# Patient Record
Sex: MALE | Race: WHITE | Employment: OTHER | URBAN - METROPOLITAN AREA
[De-identification: names, ages, dates, MRNs, and addresses within clinical notes are randomized per-mention and may not be internally consistent; named-entity substitution may affect disease eponyms.]

---

## 2017-01-02 PROBLEM — R53.82 CHRONIC FATIGUE: Status: ACTIVE | Noted: 2017-01-02

## 2017-04-03 ENCOUNTER — ANESTHESIA EVENT (OUTPATIENT)
Dept: SURGERY | Age: 72
End: 2017-04-03
Payer: MEDICARE

## 2017-04-03 ENCOUNTER — HOSPITAL ENCOUNTER (OUTPATIENT)
Age: 72
Setting detail: OUTPATIENT SURGERY
Discharge: HOME | End: 2017-04-03
Attending: UROLOGY | Admitting: UROLOGY
Payer: MEDICARE

## 2017-04-03 ENCOUNTER — ANESTHESIA (OUTPATIENT)
Dept: SURGERY | Age: 72
End: 2017-04-03
Payer: MEDICARE

## 2017-04-03 ENCOUNTER — APPOINTMENT (OUTPATIENT)
Dept: GENERAL RADIOLOGY | Age: 72
End: 2017-04-03
Attending: UROLOGY
Payer: MEDICARE

## 2017-04-03 VITALS
RESPIRATION RATE: 14 BRPM | TEMPERATURE: 97.5 F | HEIGHT: 69 IN | DIASTOLIC BLOOD PRESSURE: 71 MMHG | OXYGEN SATURATION: 97 % | BODY MASS INDEX: 27.11 KG/M2 | WEIGHT: 183 LBS | SYSTOLIC BLOOD PRESSURE: 122 MMHG | HEART RATE: 59 BPM

## 2017-04-03 PROCEDURE — A4648 IMPLANTABLE TISSUE MARKER: HCPCS | Performed by: UROLOGY

## 2017-04-03 PROCEDURE — 74430 CONTRAST X-RAY BLADDER: CPT

## 2017-04-03 PROCEDURE — 76060000032 HC ANESTHESIA 0.5 TO 1 HR: Performed by: UROLOGY

## 2017-04-03 PROCEDURE — 77030005518 HC CATH URETH FOL 2W BARD -B: Performed by: UROLOGY

## 2017-04-03 PROCEDURE — 74011636320 HC RX REV CODE- 636/320: Performed by: UROLOGY

## 2017-04-03 PROCEDURE — 77030020269 HC MISC IMPL: Performed by: UROLOGY

## 2017-04-03 PROCEDURE — 77030032490 HC SLV COMPR SCD KNE COVD -B: Performed by: UROLOGY

## 2017-04-03 PROCEDURE — 74011000250 HC RX REV CODE- 250: Performed by: UROLOGY

## 2017-04-03 PROCEDURE — 74011000250 HC RX REV CODE- 250

## 2017-04-03 PROCEDURE — 76210000006 HC OR PH I REC 0.5 TO 1 HR: Performed by: UROLOGY

## 2017-04-03 PROCEDURE — 77030012510 HC MSK AIRWY LMA TELE -B: Performed by: NURSE ANESTHETIST, CERTIFIED REGISTERED

## 2017-04-03 PROCEDURE — 74011250636 HC RX REV CODE- 250/636

## 2017-04-03 PROCEDURE — 76010000138 HC OR TIME 0.5 TO 1 HR: Performed by: UROLOGY

## 2017-04-03 PROCEDURE — 77030018846 HC SOL IRR STRL H20 ICUM -A: Performed by: UROLOGY

## 2017-04-03 PROCEDURE — 76210000021 HC REC RM PH II 0.5 TO 1 HR: Performed by: UROLOGY

## 2017-04-03 RX ORDER — FUROSEMIDE 20 MG/1
TABLET ORAL DAILY
COMMUNITY
End: 2017-05-31 | Stop reason: ALTCHOICE

## 2017-04-03 RX ORDER — LIDOCAINE HYDROCHLORIDE 10 MG/ML
INJECTION INFILTRATION; PERINEURAL AS NEEDED
Status: DISCONTINUED | OUTPATIENT
Start: 2017-04-03 | End: 2017-04-03 | Stop reason: HOSPADM

## 2017-04-03 RX ORDER — CEFAZOLIN SODIUM IN 0.9 % NACL 2 G/100 ML
PLASTIC BAG, INJECTION (ML) INTRAVENOUS AS NEEDED
Status: DISCONTINUED | OUTPATIENT
Start: 2017-04-03 | End: 2017-04-03 | Stop reason: HOSPADM

## 2017-04-03 RX ORDER — OXYCODONE AND ACETAMINOPHEN 7.5; 325 MG/1; MG/1
1 TABLET ORAL
Status: DISCONTINUED | OUTPATIENT
Start: 2017-04-03 | End: 2017-04-03 | Stop reason: HOSPADM

## 2017-04-03 RX ORDER — OXYCODONE AND ACETAMINOPHEN 5; 325 MG/1; MG/1
1-2 TABLET ORAL
Qty: 6 TAB | Refills: 0 | Status: SHIPPED | OUTPATIENT
Start: 2017-04-03 | End: 2017-08-09 | Stop reason: ALTCHOICE

## 2017-04-03 RX ORDER — SODIUM CHLORIDE, SODIUM LACTATE, POTASSIUM CHLORIDE, CALCIUM CHLORIDE 600; 310; 30; 20 MG/100ML; MG/100ML; MG/100ML; MG/100ML
INJECTION, SOLUTION INTRAVENOUS
Status: DISCONTINUED | OUTPATIENT
Start: 2017-04-03 | End: 2017-04-03 | Stop reason: HOSPADM

## 2017-04-03 RX ORDER — CEPHALEXIN 500 MG/1
500 CAPSULE ORAL 2 TIMES DAILY
Qty: 6 CAP | Refills: 0 | Status: SHIPPED | OUTPATIENT
Start: 2017-04-03 | End: 2017-04-06

## 2017-04-03 RX ORDER — DOCUSATE SODIUM 100 MG/1
100 CAPSULE, LIQUID FILLED ORAL 2 TIMES DAILY
Qty: 60 CAP | Refills: 0 | Status: SHIPPED | OUTPATIENT
Start: 2017-04-03 | End: 2017-05-03

## 2017-04-03 RX ORDER — FENTANYL CITRATE 50 UG/ML
INJECTION, SOLUTION INTRAMUSCULAR; INTRAVENOUS AS NEEDED
Status: DISCONTINUED | OUTPATIENT
Start: 2017-04-03 | End: 2017-04-03 | Stop reason: HOSPADM

## 2017-04-03 RX ORDER — ONDANSETRON 2 MG/ML
INJECTION INTRAMUSCULAR; INTRAVENOUS AS NEEDED
Status: DISCONTINUED | OUTPATIENT
Start: 2017-04-03 | End: 2017-04-03 | Stop reason: HOSPADM

## 2017-04-03 RX ORDER — SODIUM CHLORIDE, SODIUM LACTATE, POTASSIUM CHLORIDE, CALCIUM CHLORIDE 600; 310; 30; 20 MG/100ML; MG/100ML; MG/100ML; MG/100ML
50 INJECTION, SOLUTION INTRAVENOUS CONTINUOUS
Status: DISCONTINUED | OUTPATIENT
Start: 2017-04-03 | End: 2017-04-03 | Stop reason: HOSPADM

## 2017-04-03 RX ORDER — PROPOFOL 10 MG/ML
INJECTION, EMULSION INTRAVENOUS AS NEEDED
Status: DISCONTINUED | OUTPATIENT
Start: 2017-04-03 | End: 2017-04-03 | Stop reason: HOSPADM

## 2017-04-03 RX ORDER — LIDOCAINE HYDROCHLORIDE 20 MG/ML
INJECTION, SOLUTION EPIDURAL; INFILTRATION; INTRACAUDAL; PERINEURAL AS NEEDED
Status: DISCONTINUED | OUTPATIENT
Start: 2017-04-03 | End: 2017-04-03 | Stop reason: HOSPADM

## 2017-04-03 RX ORDER — FENTANYL CITRATE 50 UG/ML
50 INJECTION, SOLUTION INTRAMUSCULAR; INTRAVENOUS
Status: DISCONTINUED | OUTPATIENT
Start: 2017-04-03 | End: 2017-04-03 | Stop reason: HOSPADM

## 2017-04-03 RX ORDER — MIDAZOLAM HYDROCHLORIDE 1 MG/ML
INJECTION, SOLUTION INTRAMUSCULAR; INTRAVENOUS AS NEEDED
Status: DISCONTINUED | OUTPATIENT
Start: 2017-04-03 | End: 2017-04-03 | Stop reason: HOSPADM

## 2017-04-03 RX ADMIN — PROPOFOL 100 MG: 10 INJECTION, EMULSION INTRAVENOUS at 12:05

## 2017-04-03 RX ADMIN — ONDANSETRON 4 MG: 2 INJECTION INTRAMUSCULAR; INTRAVENOUS at 12:23

## 2017-04-03 RX ADMIN — FENTANYL CITRATE 25 MCG: 50 INJECTION, SOLUTION INTRAMUSCULAR; INTRAVENOUS at 12:29

## 2017-04-03 RX ADMIN — SODIUM CHLORIDE, SODIUM LACTATE, POTASSIUM CHLORIDE, CALCIUM CHLORIDE: 600; 310; 30; 20 INJECTION, SOLUTION INTRAVENOUS at 12:02

## 2017-04-03 RX ADMIN — LIDOCAINE HYDROCHLORIDE 40 MG: 20 INJECTION, SOLUTION EPIDURAL; INFILTRATION; INTRACAUDAL; PERINEURAL at 12:01

## 2017-04-03 RX ADMIN — FENTANYL CITRATE 25 MCG: 50 INJECTION, SOLUTION INTRAMUSCULAR; INTRAVENOUS at 12:17

## 2017-04-03 RX ADMIN — Medication 2 G: at 12:06

## 2017-04-03 RX ADMIN — FENTANYL CITRATE 50 MCG: 50 INJECTION, SOLUTION INTRAMUSCULAR; INTRAVENOUS at 12:00

## 2017-04-03 RX ADMIN — MIDAZOLAM HYDROCHLORIDE 2 MG: 1 INJECTION, SOLUTION INTRAMUSCULAR; INTRAVENOUS at 11:57

## 2017-04-03 NOTE — BRIEF OP NOTE
BRIEF OPERATIVE NOTE    Date of Procedure: 4/3/2017   Preoperative Diagnosis: prostate cancer c61  Postoperative Diagnosis: prostate cancer c61    Procedure(s): FIDUCIAL SEED MARKERS,cystogram  Surgeon(s) and Role:     * Greg Marin MD - Primary            Surgical Staff:  Circ-1: Wendie Almonte RN  Radiology Technician: ALONSO Forbes, RT, NM, ARRT  Scrub Tech-1: Shonda Burgos  Event Time In   Incision Start 12:13 PM   Incision Close      Anesthesia: General   Estimated Blood Loss: minimal  Specimens: none  Findings: Surgically absent prostate, catheter placed to identified bladder neck. Three seeds placed at bladder neck. Complications: none    Implants:   Implant Name Type Inv.  Item Serial No.  Lot No. LRB No. Used Action   Swapsee polymer markers         [id number] N/A 1 Implanted     Greg Marin MD

## 2017-04-03 NOTE — DISCHARGE INSTRUCTIONS
Discharge Instructions      Patient: Zena Vences               Sex: male          DOA: 4/3/2017         YOB: 1945      Age:  70 y.o.        LOS:  LOS: 0 days     ACUTE DIAGNOSES:  prostate cancer c61    CHRONIC MEDICAL DIAGNOSES:  Problem List as of 4/3/2017  Date Reviewed: 4/3/2017          Codes Class Noted - Resolved    Major depressive disorder ICD-10-CM: F32.9  ICD-9-CM: 296.20  Unknown - Present        HTN (hypertension) ICD-10-CM: I10  ICD-9-CM: 401.9  Unknown - Present        HLD (hyperlipidemia) ICD-10-CM: E78.5  ICD-9-CM: 272.4  Unknown - Present        Mason's esophagus with esophagitis ICD-10-CM: K22.70, K20.9  ICD-9-CM: 530.85, 530.10  Unknown - Present        Wears dentures ICD-10-CM: Z97.2, A65.196  ICD-9-CM: V45.84  Unknown - Present        Urinary tract infection, site not specified ICD-10-CM: N39.0  ICD-9-CM: 599.0  Unknown - Present        Urinary incontinence ICD-10-CM: R32  ICD-9-CM: 788.30  Unknown - Present        Renal failure ICD-10-CM: N19  ICD-9-CM: 484  Unknown - Present        Renal disease ICD-10-CM: N28.9  ICD-9-CM: 593.9  Unknown - Present        Personal history of prostate cancer ICD-10-CM: Z85.46  ICD-9-CM: V10.46  Unknown - Present        History of blood transfusion ICD-10-CM: Z92.89  ICD-9-CM: V15.89  Unknown - Present        Hiatus hernia syndrome ICD-10-CM: K44.9  ICD-9-CM: 553.3  Unknown - Present        Hiatal hernia ICD-10-CM: K44.9  ICD-9-CM: 553.3  Unknown - Present        Esophageal reflux ICD-10-CM: K21.9  ICD-9-CM: 530.81  Unknown - Present        DJD (degenerative joint disease) ICD-10-CM: M19.90  ICD-9-CM: 715.90  Unknown - Present        Depression ICD-10-CM: F32.9  ICD-9-CM: 311  Unknown - Present        Deficiency anemia ICD-10-CM: D53.9  ICD-9-CM: 281.9  Unknown - Present        Decreased hearing ICD-10-CM: H91.90  ICD-9-CM: 389. 9  Unknown - Present        Coronary artery disease ICD-10-CM: I25.10  ICD-9-CM: 414.00  Unknown - Present Asthma ICD-10-CM: J45.909  ICD-9-CM: 493.90  Unknown - Present        Anxiety ICD-10-CM: F41.9  ICD-9-CM: 300.00  Unknown - Present        Abdominal wall hernia ICD-10-CM: K43.9  ICD-9-CM: 553.20  Unknown - Present        Chronic fatigue ICD-10-CM: R53.82  ICD-9-CM: 780.79  1/2/2017 - Present        Pneumonia due to infectious organism ICD-10-CM: J18.9  ICD-9-CM: 136.9, 484.8  10/14/2016 - Present        Vitamin D deficiency ICD-10-CM: E55.9  ICD-9-CM: 268.9  9/15/2016 - Present        Mixed incontinence ICD-10-CM: N39.46  ICD-9-CM: 788.33  9/8/2016 - Present        Urinary tract infection ICD-10-CM: N39.0  ICD-9-CM: 599.0  9/8/2016 - Present        Incisional hernia ICD-10-CM: K43.2  ICD-9-CM: 553.21  9/6/2016 - Present        History of surgical procedure ICD-10-CM: Z98.890  ICD-9-CM: V45.89  8/17/2016 - Present        Bone Metastases from Prostate Cancer ICD-10-CM: C61, C79.51  ICD-9-CM: 185, 198.5  7/23/2016 - Present        Chronic low back pain ICD-10-CM: M54.5, G89.29  ICD-9-CM: 724.2, 338.29  6/13/2016 - Present        Atelectasis ICD-10-CM: J98.11  ICD-9-CM: 518.0  6/13/2016 - Present        Bronchitis, chronic, simple ([facility] [address] [facility] ICD-10-CM: J41.0  ICD-9-CM: 491.0  6/13/2016 - Present        Cardiomyopathy ([facility] ICD-10-CM: I42.9  ICD-9-CM: 425.4  Unknown - Present        Prostate cancer, [id number]  dvp Vani [id number] gl4+3(tertiary 5) psa gray 2.2 with bone mets ICD-10-CM: C61  ICD-9-CM: 185  Unknown - Present        Major depressive disorder, recurrent severe without psychotic features ([facility] ICD-10-CM: F33.2  ICD-9-CM: 296.33  8/2/2014 - Present        Essential hypertension ICD-10-CM: I10  ICD-9-CM: 401.9  Unknown - Present        COPD (chronic obstructive pulmonary disease) ([facility] ICD-10-CM: J44.9  ICD-9-CM: 80  Unknown - Present        Mason's esophagus ICD-10-CM: K22.70  ICD-9-CM: 530.85  Unknown - Present        Multiple-type hyperlipidemia ICD-10-CM: E78.4  ICD-9-CM: 272.4  Unknown - Present RESOLVED: Cardiomyopathy ICD-10-CM: 425  ICD-9-CM: 425  Unknown - 7/23/2016        RESOLVED: Renal failure ICD-10-CM: N19  ICD-9-CM: 440  Unknown - 7/23/2016              DISCHARGE MEDICATIONS:       · It is important that you take the medication exactly as they are prescribed. · Keep your medication in the bottles provided by the pharmacist and keep a list of the medication names, dosages, and times to be taken in your wallet. · Do not take other medications without consulting your doctor. DIET:  Resume previous diet    ACTIVITY: No lifting, Driving, or Strenuous exercise for 48 hours. ADDITIONAL INFORMATION: If you experience any of the following symptoms then please call your primary care physician or return to the emergency room if you cannot get hold of your doctor: Fever, chills, nausea, vomiting, diarrhea, change in mentation, falling, bleeding, shortness of breath. FOLLOW UP CARE:  Dr. Gordon Lora, NP  you are to call and set up an appointment to see them in 2 weeks. Follow-up with Dr. Pieter Thomas after Proton therapy completed. Information obtained by :  I understand that if any problems occur once I am at home I am to contact my physician. I understand and acknowledge receipt of the instructions indicated above.                                                                                                                                            Physician's or R.N.'s Signature                                                                  Date/Time                                                                                                                                              Patient or Representative Signature                                                          Date/Time2      DISCHARGE SUMMARY from Nurse    The following personal items are in your possession at time of discharge:    Dental Appliances: Uppers, Lowers, At home  Visual Aid: None  Hearing Aids/Status: At home                         PATIENT INSTRUCTIONS:    After general anesthesia or intravenous sedation, for 24 hours or while taking prescription Narcotics:  · Limit your activities  · Do not drive and operate hazardous machinery  · Do not make important personal or business decisions  · Do  not drink alcoholic beverages  · If you have not urinated within 8 hours after discharge, please contact your surgeon on call. Report the following to your surgeon:  · Excessive pain, swelling, redness or odor of or around the surgical area  · Temperature over 100.5  · Nausea and vomiting lasting longer than 4 hours or if unable to take medications  · Any signs of decreased circulation or nerve impairment to extremity: change in color, persistent  numbness, tingling, coldness or increase pain  · Any questions        What to do at Home:  These are general instructions for a healthy lifestyle:    No smoking/ No tobacco products/ Avoid exposure to second hand smoke    Surgeon General's Warning:  Quitting smoking now greatly reduces serious risk to your health. Obesity, smoking, and sedentary lifestyle greatly increases your risk for illness    A healthy diet, regular physical exercise & weight monitoring are important for maintaining a healthy lifestyle    You may be retaining fluid if you have a history of heart failure or if you experience any of the following symptoms:  Weight gain of 3 pounds or more overnight or 5 pounds in a week, increased swelling in our hands or feet or shortness of breath while lying flat in bed. Please call your doctor as soon as you notice any of these symptoms; do not wait until your next office visit.     Recognize signs and symptoms of STROKE:    F-face looks uneven    A-arms unable to move or move unevenly    S-speech slurred or non-existent    T-time-call 911 as soon as signs and symptoms begin-DO NOT go       Back to bed or wait to see if you get better-TIME IS BRAIN. Warning Signs of HEART ATTACK     Call 911 if you have these symptoms:   Chest discomfort. Most heart attacks involve discomfort in the center of the chest that lasts more than a few minutes, or that goes away and comes back. It can feel like uncomfortable pressure, squeezing, fullness, or pain.  Discomfort in other areas of the upper body. Symptoms can include pain or discomfort in one or both arms, the back, neck, jaw, or stomach.  Shortness of breath with or without chest discomfort.  Other signs may include breaking out in a cold sweat, nausea, or lightheadedness. Don't wait more than five minutes to call 911 - MINUTES MATTER! Fast action can save your life. Calling 911 is almost always the fastest way to get lifesaving treatment. Emergency Medical Services staff can begin treatment when they arrive -- up to an hour sooner than if someone gets to the hospital by car. The discharge information has been reviewed with the patient. The patient verbalized understanding. Discharge medications reviewed with the patient and appropriate educational materials and side effects teaching were provided. Patient armband removed and given to patient to take home.   Patient was informed of the privacy risks if armband lost or stolen

## 2017-04-03 NOTE — ANESTHESIA POSTPROCEDURE EVALUATION
Post-Anesthesia Evaluation & Assessment    Visit Vitals    /70    Pulse 60    Temp 36.4 °C (97.5 °F)    Resp 20    Ht 5' 9\" (1.753 m)    Wt 83 kg (183 lb)    SpO2 100%    BMI 27.02 kg/m2       Nausea/Vomiting controlled    Post-operative hydration adequate. Mental status & Level of consciousness: alert and oriented x 3    Neurological status: moves all extremities, sensation grossly intact    Pulmonary status: airway patent, no supplemental oxygen required    Complications related to anesthesia: none    Patient has met all discharge requirements.     Additional comments:        Cresencio Reyes, CRNA

## 2017-04-03 NOTE — H&P
Stephanie Vargas  1945     ASSESSMENT:       ICD-10-CM ICD-9-CM     1. Prostate cancer, [id number] dvp [place] [id number] gl4+3(tertiary 5) psa gray 2.2 with bone mets C61 185 AMB POC URINALYSIS DIP STICK AUTO W/O MICRO   2. Urinary frequency R35.0 788.41 AMB POC URINALYSIS DIP STICK AUTO W/O MICRO      1. [id number] gl4+3, s/p DVP in 12/9/15 with presenting PSA of 9.7ng/ml. Bone scan 6/2016 revealed findings worrisome for metastatic osseous disease in the left ischium and possible metastatic osseous disease in both iliac bones anteriorly. Dr. Dario Veras did not believe patient had metastatic disease. Discussed indications, r/b of proton therapy. Patient inquiring if estrogen levels will be affected and how that will impact his hypogonadism. All questions answered  2. S/p orchiectomy in April 2016.   3. Osteopenia      PLAN:    1. Follow up as scheduled for fiducial markers only as we feel SpaceOAR is not indicated in this instance. 2. Will send chart to Dr. Judit Ferrara to review need for possible estrogen for cardio protection as he has had recent bout of CHF and history of viral cardiomyopathy. Will also have him discuss Prolia vs Xgeva for osteopenia.          Chief Complaint   Patient presents with    Prostate Cancer         HISTORY OF PRESENT ILLNESS: Stephanie Vargas is a 70 y.o. male who presents today for fiducial marker and spaceOAR gel placement consult. He is referred by Dr. Mateo Plummer. The patient is s/p DVP in [facility] on 12/9/2015 for [id number] Christopher 4+3 (tertiary 5) adenocarcinoma of the prostate. PSA at diagnosis of 9.7ng/ml. Had postop PSA at 4 months which was reportedly 2.2ng/ml. States preop CT and bone scan were negative for metastatic disease.  His bone scan 6/2016 revealed findings worrisome for metastatic osseous disease in the left ischium and possible metastatic osseous disease in both iliac bones anteriorly.     He is s/p orchiectomy in April 2016 and his PSA has gone down some but still detectable. Dr. Gabino Jones wants to ensure prostate bed is treated fully and he is asking for Proton therapy.          PSA /TESTOSTERONE - BSI PSA   Latest Ref Rng & Units 0.00 - 4.00 ng/mL   6/3/2016 3.27   4/20/2015 9.3 (A)   2/19/2015 6.8 (A)   12/12/2014 4.24 (A)           Past Medical History:   Diagnosis Date    Abdominal wall hernia      Anxiety      Asthma      Mason's esophagus with esophagitis      Cardiomyopathy      Chronic fatigue      CKD (chronic kidney disease), stage III      COPD (chronic obstructive pulmonary disease) (HCC)      Coronary artery disease      Decreased hearing      Deficiency anemia      Depression      DJD (degenerative joint disease)      Esophageal reflux      Hiatal hernia      Hiatus hernia syndrome      History of blood transfusion      HLD (hyperlipidemia)      HTN (hypertension)      Major depressive disorder      Mixed incontinence      Personal history of prostate cancer      Prostate cancer ([facility]      Prostate cancer metastatic to bone ([facility]      Renal disease      Renal failure      Urinary incontinence      Urinary tract infection, site not specified      UTI (urinary tract infection)      Vitamin D deficiency      Wears dentures              Past Surgical History:   Procedure Laterality Date    BIOPSY PROSTATE         [id number] gl4+3.     HX HERNIA REPAIR        HX KNEE REPLACEMENT        HX ORCHIECTOMY   08/11/2016    HX OTHER SURGICAL         IMPLANTS    HX OTHER SURGICAL         TOTAL KNEE/ TOTAL HIP REPLACEMENT PRE OP ORDERSET    HX OTHER SURGICAL   08/11/2016     HERNIA REPAIR LAPAROSCOPIC    HX OTHER SURGICAL   08/11/2016     TESTICULAR PROSTHESIS INSERTION    HX RADICAL PROSTATECTOMY   12.2015           Social History   Substance Use Topics    Smoking status: Never Smoker    Smokeless tobacco: Never Used    Alcohol use No            Allergies   Allergen Reactions    Egg Unable to Obtain    Ketorolac Tromethamine Other (comments)       Kidney insufficient    Nsaids (Non-Steroidal Anti-Inflammatory Drug) Other (comments)    Prilosec [Omeprazole] Other (comments)       Flank and abd pain    Proton Pump Inhibitors Other (comments)             Family History   Problem Relation Age of Onset    Other Sister         cerebral palsy    Cancer Other         colon             Current Outpatient Prescriptions   Medication Sig Dispense Refill    ondansetron (ZOFRAN ODT) 4 mg disintegrating tablet 8 mg.        traZODone (DESYREL) 150 mg tablet TAKE 2 TABLETS EVERYDAY AT BEDTIME   0    peg-electrolyte soln (NULYTELY) 420 gram solution USE AS DIRECTED   0    ADVAIR DISKUS 500-50 mcg/dose diskus inhaler TAKE 1 PUFF INHALED BY MOUTH TWICE DAILY. FOR COPD   12    amitriptyline (ELAVIL) 50 mg tablet TAKE 2 TABLETS BY MOUTH EVERY NIGHT AT BEDTIME   1    QUEtiapine (SEROQUEL) 100 mg tablet 100 mg.        hydrALAZINE (APRESOLINE) 25 mg tablet ONE TABLET THREE TIMES A DAY BY MOUTH   6    raNITIdine (ZANTAC) 150 mg tablet TAKE 1 TAB BY MOUTH TWICE DAILY.   3    pravastatin (PRAVACHOL) 20 mg tablet          ipratropium (ATROVENT) 0.02 % nebulizer solution 2.5 mL by Nebulization route as needed for Wheezing. 1 Each 0    aspirin delayed-release 325 mg tablet Take by mouth every six (6) hours as needed.        amLODIPine (NORVASC) 5 mg tablet Take 15 mg by mouth daily.        carvedilol (COREG) 25 mg tablet Take 25 mg by mouth two (2) times daily (with meals).           Review of Systems  Constitutional: Fever: No  Skin: Rash: No  HEENT: Hearing difficulty: No  Eyes: Blurred vision: No  Cardiovascular: Chest pain: No  Respiratory: Shortness of breath: No  Gastrointestinal: Nausea/vomiting: No  Musculoskeletal: Back pain: No  Neurological: Weakness: No  Psychological: Memory loss: No  Comments/additional findings:      PHYSICAL EXAMINATION:        Visit Vitals    /70    Ht 5' 9\" (1.753 m)    Wt 187 lb (84.8 kg)    BMI 27.62 kg/m2      Constitutional: WDWN, Pleasant and appropriate affect, No acute distress. CV: No peripheral swelling noted, RRR  Respiratory: No respiratory distress or difficulties, CTAB  Abdomen: No abdominal masses or tenderness. No CVA tenderness. No inguinal hernias noted.  Male (8/11/16):   PRAKASH:Perineum normal to visual inspection, no erythema or irritation, Sphincter with good tone, Rectum with no hemorrhoids, fissures or masses, Prostate absent; rectum  negative without mass, lesions or tenderness  SCROTUM: No scrotal rash or lesions noticed. Normal bilateral testes and epididymis. PENIS: Urethral meatus normal in location and size. No urethral discharge. Skin: No jaundice. Neuro/Psych: Alert and oriented x 3, affect appropriate. Lymphatic: No enlarged inguinal lymph nodes.      REVIEW OF LABS AND IMAGING:         Results for orders placed or performed in visit on 02/22/17   PROSTATE SPECIFIC AG (PSA)   Result Value Ref Range     Prostate Specific Ag 1.49 0.00 - 4.00 ng/mL   PROSTATE SPECIFIC AG (PSA)   Result Value Ref Range     Prostate Specific Ag 4.24 (A) 0.00 - 4.00 ng/mL   PROSTATE SPECIFIC AG (PSA)   Result Value Ref Range     Prostate Specific Ag 6.8 (A) 0.00 - 4.00 ng/mL   PROSTATE SPECIFIC AG (PSA)   Result Value Ref Range     Prostate Specific Ag 9.3 (A) 0.00 - 4.00 ng/mL      Bone Scan 6/18/16:  IMPRESSION:  Findings worrisome for metastatic osseous disease in the left  ischium. Possible metastatic osseous disease in both iliac bones  anteriorly. Multifocal degenerative and arthropathic uptake. Findings of remote granulomatous infection. There is mild dilatation of the main pancreatic duct which is  nonspecific.  There is no pancreatic atrophy.  Clinical  correlation recommended.  Evaluation of the pancreas for neoplasm  is limited in the absence of IV contrast.  A contrast-enhanced  abdominal MRI could be considered for further characterization. Cholelithiasis.   Right pelvic sidewall lymphocele.   Multifocal subcutaneous calcifications likely represent injection  sites or fat necrosis.  A few small low density collections are  associated with calcifications in the right buttock.  Correlate  with surgical history and history of cosmetic injections.        Courtney Carranza MD on 4/3/2017

## 2017-04-03 NOTE — IP AVS SNAPSHOT
[address] 
[phone] Patient: Fany Medina MRN: [id number] UWF:[id number] You are allergic to the following Allergen Reactions Egg Unable to Consolidated Abdiel Ketorolac Tromethamine Other (comments) Kidney insufficient Nsaids (Non-Steroidal Anti-Inflammatory Drug) Other (comments) Prilosec (Omeprazole) Other (comments) Flank and abd pain Proton Pump Inhibitors Other (comments) Recent Documentation Height Weight BMI Smoking Status 1.753 m 83 kg 27.02 kg/m2 Never Smoker Emergency Contacts Name Discharge Info Relation Home Work Mobile Aj Baeza  Other Relative [6] [phone] Izzy Job  Child [2] [phone] Sami De Guzman DISCHARGE CAREGIVER [3] Friend [5]   [phone] About your hospitalization You were admitted on:  April 3, 2017 You last received care in the:  [facility] PHASE 2 RECOVERY You were discharged on:  April 3, 2017 Unit phone number:  [phone] Why you were hospitalized Your primary diagnosis was:  Not on File Your diagnoses also included:  Prostate Cancer (Hcc) Providers Seen During Your Hospitalizations Provider Role Specialty Primary office phone Inez Motta MD Attending Provider Urology [phone] Your Primary Care Physician (PCP) Primary Care Physician Office Phone Office Fax Olga Vega [phone] Follow-up Information Follow up With Details Comments Contact Info Manju Guzmán NP   [address] 
[phone] Current Discharge Medication List  
  
START taking these medications Dose & Instructions Dispensing Information Comments Morning Noon Evening Bedtime  
 cephALEXin 500 mg capsule Commonly known as:  Erling Mittal Your last dose was: Your next dose is:    
   
   
 Dose:  500 mg Take 1 Cap by mouth two (2) times a day for 3 days. Quantity:  6 Cap Refills:  0  
     
   
   
   
  
 docusate sodium 100 mg capsule Commonly known as:  Philip [place] Your last dose was: Your next dose is:    
   
   
 Dose:  100 mg Take 1 Cap by mouth two (2) times a day for 30 days. Quantity:  60 Cap Refills:  0  
     
   
   
   
  
 oxyCODONE-acetaminophen 5-325 mg per tablet Commonly known as:  PERCOCET Your last dose was: Your next dose is:    
   
   
 Dose:  1-2 Tab Take 1-2 Tabs by mouth every four (4) hours as needed for Pain. Max Daily Amount: 12 Tabs. Quantity:  6 Tab Refills:  0 CONTINUE these medications which have NOT CHANGED Dose & Instructions Dispensing Information Comments Morning Noon Evening Bedtime ADVAIR DISKUS 500-50 mcg/dose diskus inhaler Generic drug:  fluticasone-salmeterol Your last dose was: Your next dose is: TAKE 1 PUFF INHALED BY MOUTH TWICE DAILY. FOR COPD Refills:  12  
     
   
   
   
  
 amitriptyline 50 mg tablet Commonly known as:  ELAVIL Your last dose was: Your next dose is: TAKE 2 TABLETS BY MOUTH EVERY NIGHT AT BEDTIME Refills:  1  
     
   
   
   
  
 amLODIPine 5 mg tablet Commonly known as:  Amelia Pearson Your last dose was: Your next dose is:    
   
   
 Dose:  15 mg Take 15 mg by mouth daily. Refills:  0  
     
   
   
   
  
 aspirin delayed-release 325 mg tablet Your last dose was: Your next dose is: Take  by mouth every six (6) hours as needed. Refills:  0 COREG 25 mg tablet Generic drug:  carvedilol Your last dose was: Your next dose is:    
   
   
 Dose:  25 mg Take 25 mg by mouth two (2) times daily (with meals). Refills:  0 hydrALAZINE 25 mg tablet Commonly known as:  APRESOLINE Your last dose was: Your next dose is: ONE TABLET THREE TIMES A DAY BY MOUTH Refills:  6  
     
   
   
   
  
 ipratropium 0.02 % nebulizer solution Commonly known as:  ATROVENT Your last dose was: Your next dose is:    
   
   
 Dose:  0.5 mg  
2.5 mL by Nebulization route as needed for Wheezing. Quantity:  1 Each Refills:  0  
     
   
   
   
  
 LASIX 20 mg tablet Generic drug:  furosemide Your last dose was: Your next dose is: Take  by mouth daily. Refills:  0  
     
   
   
   
  
 ondansetron 4 mg disintegrating tablet Commonly known as:  ZOFRAN ODT Your last dose was: Your next dose is:    
   
   
 Dose:  8 mg  
8 mg. Refills:  0  
     
   
   
   
  
 pravastatin 20 mg tablet Commonly known as:  PRAVACHOL Your last dose was: Your next dose is:    
   
   
  Refills:  0 QUEtiapine 100 mg tablet Commonly known as:  SEROquel Your last dose was: Your next dose is:    
   
   
 Dose:  100 mg  
100 mg. Refills:  0  
     
   
   
   
  
 raNITIdine 150 mg tablet Commonly known as:  ZANTAC Your last dose was: Your next dose is: TAKE 1 TAB BY MOUTH TWICE DAILY. Refills:  3  
     
   
   
   
  
 traZODone 150 mg tablet Commonly known as:  Ruthe Boga Your last dose was: Your next dose is: TAKE 2 TABLETS EVERYDAY AT BEDTIME Refills:  0 Where to Get Your Medications Information on where to get these meds will be given to you by the nurse or doctor. ! Ask your nurse or doctor about these medications  
  cephALEXin 500 mg capsule  
 docusate sodium 100 mg capsule  
 oxyCODONE-acetaminophen 5-325 mg per tablet Discharge Instructions Discharge Instructions Patient: Roxana Yuan               Sex: male          DOA: 4/3/2017 YOB: 1945      Age:  70 y.o.        LOS:  LOS: 0 days ACUTE DIAGNOSES: 
prostate cancer c61 CHRONIC MEDICAL DIAGNOSES: 
Problem List as of 4/3/2017  Date Reviewed: 4/3/2017 Codes Class Noted - Resolved Major depressive disorder ICD-10-CM: F32.9 ICD-9-CM: 296.20  Unknown - Present HTN (hypertension) ICD-10-CM: I10 
ICD-9-CM: 401.9  Unknown - Present HLD (hyperlipidemia) ICD-10-CM: E78.5 ICD-9-CM: 272.4  Unknown - Present Mason's esophagus with esophagitis ICD-10-CM: K22.70, K20.9 ICD-9-CM: 530.85, 530.10  Unknown - Present Wears dentures ICD-10-CM: Z97.2, O63.969 ICD-9-CM: V45.84  Unknown - Present Urinary tract infection, site not specified ICD-10-CM: N39.0 ICD-9-CM: 599.0  Unknown - Present Urinary incontinence ICD-10-CM: R32 
ICD-9-CM: 788.30  Unknown - Present Renal failure ICD-10-CM: N19 
ICD-9-CM: 375  Unknown - Present Renal disease ICD-10-CM: N28.9 ICD-9-CM: 593.9  Unknown - Present Personal history of prostate cancer ICD-10-CM: Z85.46 
ICD-9-CM: V10.46  Unknown - Present History of blood transfusion ICD-10-CM: Z92.89 ICD-9-CM: V15.89  Unknown - Present Hiatus hernia syndrome ICD-10-CM: K44.9 ICD-9-CM: 553.3  Unknown - Present Hiatal hernia ICD-10-CM: K44.9 ICD-9-CM: 553.3  Unknown - Present Esophageal reflux ICD-10-CM: K21.9 ICD-9-CM: 530.81  Unknown - Present DJD (degenerative joint disease) ICD-10-CM: M19.90 ICD-9-CM: 715.90  Unknown - Present Depression ICD-10-CM: F32.9 ICD-9-CM: 311  Unknown - Present Deficiency anemia ICD-10-CM: D53.9 ICD-9-CM: 281.9  Unknown - Present Decreased hearing ICD-10-CM: H91.90 ICD-9-CM: 124. 9  Unknown - Present Coronary artery disease ICD-10-CM: I25.10 ICD-9-CM: 414.00  Unknown - Present Asthma ICD-10-CM: I75.866 ICD-9-CM: 493.90  Unknown - Present Anxiety ICD-10-CM: F41.9 ICD-9-CM: 300.00  Unknown - Present Abdominal wall hernia ICD-10-CM: K43.9 ICD-9-CM: 553.20  Unknown - Present Chronic fatigue ICD-10-CM: R53.82 
ICD-9-CM: 780.79  1/2/2017 - Present Pneumonia due to infectious organism ICD-10-CM: J18.9 ICD-9-CM: 136.9, 484.8  10/14/2016 - Present Vitamin D deficiency ICD-10-CM: E55.9 ICD-9-CM: 268.9  9/15/2016 - Present Mixed incontinence ICD-10-CM: N39.46 
ICD-9-CM: 788.33  9/8/2016 - Present Urinary tract infection ICD-10-CM: N39.0 ICD-9-CM: 599.0  9/8/2016 - Present Incisional hernia ICD-10-CM: V86.0 ICD-9-CM: 553.21  9/6/2016 - Present History of surgical procedure ICD-10-CM: Z98.890 ICD-9-CM: V45.89  8/17/2016 - Present Bone Metastases from Prostate Cancer ICD-10-CM: C61, C79.51 
ICD-9-CM: 185, 198.5  7/23/2016 - Present Chronic low back pain ICD-10-CM: M54.5, G89.29 ICD-9-CM: 724.2, 338.29  6/13/2016 - Present Atelectasis ICD-10-CM: J98.11 ICD-9-CM: 518.0  6/13/2016 - Present Bronchitis, chronic, simple ([facility] [address] ICD-10-CM: J41.0 ICD-9-CM: 491.0  6/13/2016 - [facility] ICD-10-CM: I42.9 ICD-9-CM: 425.4  Unknown - Present Prostate cancer, [id number]  dvp Dumont [id number] gl4+3(tertiary 5) psa gray 2.2 with bone mets ICD-10-CM: C61 ICD-9-CM: 185  Unknown - Present Major depressive disorder, recurrent severe without psychotic features ([facility] [address] ICD-10-CM: F33.2 ICD-9-CM: 296.33  8/2/2014 - Present Essential hypertension ICD-10-CM: I10 
ICD-9-CM: 401.9  Unknown - Present COPD (chronic obstructive pulmonary disease) (HCC) ICD-10-CM: J44.9 ICD-9-CM: 496  Unknown - Present Mason's esophagus ICD-10-CM: K22.70 ICD-9-CM: 530.85  Unknown - Present Multiple-type hyperlipidemia ICD-10-CM: E78.4 ICD-9-CM: 272.4  Unknown - Present RESOLVED: Cardiomyopathy ICD-10-CM: 077 ICD-9-CM: 425  Unknown - 7/23/2016 RESOLVED: Renal failure ICD-10-CM: N19 
ICD-9-CM: 027  Unknown - 7/23/2016 DISCHARGE MEDICATIONS:  
 
 
· It is important that you take the medication exactly as they are prescribed. · Keep your medication in the bottles provided by the pharmacist and keep a list of the medication names, dosages, and times to be taken in your wallet. · Do not take other medications without consulting your doctor. DIET:  Resume previous diet ACTIVITY: No lifting, Driving, or Strenuous exercise for 48 hours. ADDITIONAL INFORMATION: If you experience any of the following symptoms then please call your primary care physician or return to the emergency room if you cannot get hold of your doctor: Fever, chills, nausea, vomiting, diarrhea, change in mentation, falling, bleeding, shortness of breath. FOLLOW UP CARE: 
Dr. Zohaib Choudhury, NP  you are to call and set up an appointment to see them in 2 weeks. Follow-up with Dr. Bebeto Guerra after Proton therapy completed. Information obtained by : 
I understand that if any problems occur once I am at home I am to contact my physician. I understand and acknowledge receipt of the instructions indicated above. Physician's or R.N.'s Signature                                                                  Date/Time Patient or Representative Signature                                                          Date/Time2 DISCHARGE SUMMARY from Nurse The following personal items are in your possession at time of discharge: 
 
Dental Appliances: Uppers, Lowers, At home Visual Aid: None Hearing Aids/Status: At home PATIENT INSTRUCTIONS: 
 
After general anesthesia or intravenous sedation, for 24 hours or while taking prescription Narcotics: · Limit your activities · Do not drive and operate hazardous machinery · Do not make important personal or business decisions · Do  not drink alcoholic beverages · If you have not urinated within 8 hours after discharge, please contact your surgeon on call. Report the following to your surgeon: 
· Excessive pain, swelling, redness or odor of or around the surgical area · Temperature over 100.5 · Nausea and vomiting lasting longer than 4 hours or if unable to take medications · Any signs of decreased circulation or nerve impairment to extremity: change in color, persistent  numbness, tingling, coldness or increase pain · Any questions What to do at Home: These are general instructions for a healthy lifestyle: No smoking/ No tobacco products/ Avoid exposure to second hand smoke Surgeon General's Warning:  Quitting smoking now greatly reduces serious risk to your health. Obesity, smoking, and sedentary lifestyle greatly increases your risk for illness A healthy diet, regular physical exercise & weight monitoring are important for maintaining a healthy lifestyle You may be retaining fluid if you have a history of heart failure or if you experience any of the following symptoms:  Weight gain of 3 pounds or more overnight or 5 pounds in a week, increased swelling in our hands or feet or shortness of breath while lying flat in bed. Please call your doctor as soon as you notice any of these symptoms; do not wait until your next office visit. Recognize signs and symptoms of STROKE: 
 
F-face looks uneven A-arms unable to move or move unevenly S-speech slurred or non-existent T-time-call 911 as soon as signs and symptoms begin-DO NOT go  
 Back to bed or wait to see if you get better-TIME IS BRAIN. Warning Signs of HEART ATTACK Call 911 if you have these symptoms: 
? Chest discomfort. Most heart attacks involve discomfort in the center of the chest that lasts more than a few minutes, or that goes away and comes back. It can feel like uncomfortable pressure, squeezing, fullness, or pain. ? Discomfort in other areas of the upper body. Symptoms can include pain or discomfort in one or both arms, the back, neck, jaw, or stomach. ? Shortness of breath with or without chest discomfort. ? Other signs may include breaking out in a cold sweat, nausea, or lightheadedness. Don't wait more than five minutes to call [address]! Fast action can save your life. Calling 911 is almost always the fastest way to get lifesaving treatment. Emergency Medical Services staff can begin treatment when they arrive  up to an hour sooner than if someone gets to the hospital by car. The discharge information has been reviewed with the patient. The patient verbalized understanding. Discharge medications reviewed with the patient and appropriate educational materials and side effects teaching were provided. Patient armband removed and given to patient to take home. Patient was informed of the privacy risks if armband lost or stolen Discharge Orders None Introducing [facility]! Rolanda Garcia introduces [employer] patient portal. Now you can access parts of your medical record, email your doctor's office, and request medication refills online. 1. In your internet browser, go to [email or web address] 2. Click on the First Time User? Click Here link in the Sign In box. You will see the New Member Sign Up page. 3. Enter your [employer] Access Code exactly as it appears below. You will not need to use this code after youve completed the sign-up process.  If you do not sign up before the expiration date, you must request a new code. · [employer] Access Code: [email or web address] Expires: 6/29/2017  4:39 PM 
 
4. Enter the last four digits of your Social Security Number (xxxx) and Date of Birth (mm/dd/yyyy) as indicated and click Submit. You will be taken to the next sign-up page. 5. Create a POWWOW ID. This will be your P[employer] login ID and cannot be changed, so think of one that is secure and easy to remember. 6. Create a P[employer]OW password. You can change your password at any time. 7. Enter your Password Reset Question and Answer. This can be used at a later time if you forget your password. 8. Enter your e-mail address. You will receive e-mail notification when new information is available in [address] 9. Click Sign Up. You can now view and download portions of your medical record. 10. Click the Download Summary menu link to download a portable copy of your medical information. If you have questions, please visit the Frequently Asked Questions section of the [employer] website. Remember, [employer] is NOT to be used for urgent needs. For medical emergencies, dial 911. Now available from your iPhone and Android! General Information Please provide this summary of care documentation to your next provider. Patient Signature:  ____________________________________________________________ Date:  ____________________________________________________________  
  
Tulio Isbell Provider Signature:  ____________________________________________________________ Date:  ____________________________________________________________

## 2017-04-03 NOTE — PERIOP NOTES
1247  Patient received in PACU and connected to monitors. Vital signs stable. RN at bedside. Will continue to monitor. 0  Dr. Fred Melo at bedside to talk with pt.

## 2017-04-03 NOTE — ANESTHESIA PREPROCEDURE EVALUATION
Anesthetic History   No history of anesthetic complications            Review of Systems / Medical History  Patient summary reviewed and pertinent labs reviewed    Pulmonary    COPD        Asthma        Neuro/Psych              Cardiovascular    Hypertension          CAD         GI/Hepatic/Renal     GERD           Endo/Other        Arthritis     Other Findings              Physical Exam    Airway  Mallampati: II  TM Distance: 4 - 6 cm  Neck ROM: normal range of motion   Mouth opening: Normal     Cardiovascular  Regular rate and rhythm,  S1 and S2 normal,  no murmur, click, rub, or gallop             Dental  No notable dental hx       Pulmonary  Breath sounds clear to auscultation               Abdominal  Abdominal exam normal       Other Findings   Comments:   Risk Factors for Postoperative nausea/vomiting:       History of postoperative nausea/vomiting? NO       Female? NO       Motion sickness? NO       Intended opioid administration for postoperative analgesia?   NO         Anesthetic Plan    ASA: 3  Anesthesia type: general          Induction: Intravenous  Anesthetic plan and risks discussed with: Patient

## 2017-06-08 ENCOUNTER — HOSPITAL ENCOUNTER (OUTPATIENT)
Dept: BONE DENSITY | Age: 72
Discharge: HOME | End: 2017-06-08
Attending: UROLOGY
Payer: MEDICARE

## 2017-06-08 DIAGNOSIS — M85.89 OSTEOPENIA OF MULTIPLE SITES: ICD-10-CM

## 2017-06-08 DIAGNOSIS — C61 PROSTATE CANCER (HCC): ICD-10-CM

## 2017-06-08 PROCEDURE — 77080 DXA BONE DENSITY AXIAL: CPT

## 2017-06-08 PROCEDURE — 77081 DXA BONE DENSITY APPENDICULR: CPT

## 2017-07-03 DIAGNOSIS — J45.909 UNCOMPLICATED ASTHMA, UNSPECIFIED ASTHMA SEVERITY: Primary | ICD-10-CM

## 2017-07-03 DIAGNOSIS — J44.9 CHRONIC OBSTRUCTIVE PULMONARY DISEASE, UNSPECIFIED COPD TYPE (HCC): ICD-10-CM

## 2017-07-14 ENCOUNTER — OFFICE VISIT (OUTPATIENT)
Dept: PULMONOLOGY | Age: 72
End: 2017-07-14

## 2017-07-14 VITALS
HEART RATE: 56 BPM | DIASTOLIC BLOOD PRESSURE: 70 MMHG | SYSTOLIC BLOOD PRESSURE: 120 MMHG | BODY MASS INDEX: 28.29 KG/M2 | TEMPERATURE: 97.8 F | HEIGHT: 69 IN | RESPIRATION RATE: 23 BRPM | OXYGEN SATURATION: 97 % | WEIGHT: 191 LBS

## 2017-07-14 DIAGNOSIS — J44.9 CHRONIC OBSTRUCTIVE PULMONARY DISEASE, UNSPECIFIED COPD TYPE (HCC): ICD-10-CM

## 2017-07-14 DIAGNOSIS — J45.909 UNCOMPLICATED ASTHMA, UNSPECIFIED ASTHMA SEVERITY: ICD-10-CM

## 2017-07-14 DIAGNOSIS — J44.9 ASTHMA WITH COPD (HCC): Primary | ICD-10-CM

## 2017-07-14 RX ORDER — ALBUTEROL SULFATE 0.83 MG/ML
SOLUTION RESPIRATORY (INHALATION)
Qty: 120 EACH | Refills: 6 | Status: SHIPPED | OUTPATIENT
Start: 2017-07-14 | End: 2017-08-09 | Stop reason: ALTCHOICE

## 2017-07-14 RX ORDER — IPRATROPIUM BROMIDE AND ALBUTEROL SULFATE 2.5; .5 MG/3ML; MG/3ML
3 SOLUTION RESPIRATORY (INHALATION)
COMMUNITY
End: 2017-07-14 | Stop reason: ALTCHOICE

## 2017-07-14 RX ORDER — FLUTICASONE FUROATE AND VILANTEROL 200; 25 UG/1; UG/1
1 POWDER RESPIRATORY (INHALATION) DAILY
Qty: 3 EACH | Refills: 0 | Status: SHIPPED | COMMUNITY
Start: 2017-07-14 | End: 2017-09-15

## 2017-07-14 RX ORDER — GABAPENTIN 100 MG/1
CAPSULE ORAL 3 TIMES DAILY
COMMUNITY
End: 2017-08-09 | Stop reason: ALTCHOICE

## 2017-07-14 RX ORDER — ALBUTEROL SULFATE 0.83 MG/ML
2.5 SOLUTION RESPIRATORY (INHALATION)
Qty: 24 EACH | Refills: 6 | Status: SHIPPED | OUTPATIENT
Start: 2017-07-14 | End: 2017-07-14 | Stop reason: SDUPTHER

## 2017-07-14 NOTE — COMMUNICATION BODY
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 He was initially found to have cardiomyopathy due to viral illness in . He had an EF of 15% initially. He had normal coronaries at that time. In years past he's had EF's as high as 50% but his most recent echo earlier this year showed an ejection fraction down to 35%. He's had progressive decrease in blood pressures and has had his meds reduced. He's no longer taking hydralazine and his Coreg dose has been decreasd from 50 to 25 mg bid. His main complaints are continued fatigue and shortness of breath. He had asthma as a child and hence the diagnosis of COPD  He denies cigarette smoking. He had been on Spiriva and Advair but had to stop Spiriva about 1 year back due to cost of medication. Never hospitalized for COPD/asthma exacerbations but has had several ER visits for bronchitis, Pneumonia. Admits to reflux. Denies significant cough, dry or productive. Denies nasal congestion, postnasal drainage. C/o severe leg weakness- bilateral.  C/o occasional edema LE. He has had no chest pain. He does have a history of hypertension. He has had a recent increase in hemoglobin A1C but no history of overt diabetes. He has elevated cholesterol on medications. There is family history of cardiac disease. He denies stroke, rheumatic fever, or claudication. He is currently undergoing radiation therapy for prostrate ca. Had prostrate surgery followed by orchiectomy in [id number]. No chemotherapy.     PAST SURGICAL HISTORY: Pertinent for a prostatectomy in , orchiectomy in 2016, hiatal hernia repair in  which failed, and total knee replacement on the right. MEDICAL HISTORY: Pertinent for the cardiomyopathy, hypertension, COPD with asthma, Mason's esophagus, pectus excavatum with pectoral implants. SOB:   Symptoms occur on exertion. Able to walk about 200  Feet. Cannot climb stairs. Activities of daily living are affected  Denies orthopnea/ paroxysmal nocturnal dyspnea  SOB relieved with rest, inhalers/ nebulizers    Treatment so far- Advair, duoneb  Imaging studies- CXR  Other testing- PFT, 6 min walk  Comorbid conditions include- HTN, Cardiomyopathy, Neuropathy, Mason's esophagus, Hiatal hernia, Pectus deformity  Never Smoker  Occupational exposure-none      Past Medical History:   Diagnosis Date    Abdominal wall hernia     Anxiety     Asthma     Mason's esophagus with esophagitis     Cardiomyopathy     Chronic fatigue     Chronic lung disease     CKD (chronic kidney disease), stage III     COPD (chronic obstructive pulmonary disease) ([address]     Coronary artery disease     Decreased hearing     Deficiency anemia     Depression     DJD (degenerative joint disease)     Esophageal reflux     Hiatal hernia     Hiatus hernia syndrome     History of blood transfusion     HLD (hyperlipidemia)     HTN (hypertension)     Major depressive disorder     Mixed incontinence     Personal history of prostate cancer     [facility] [facility]     Prostate cancer metastatic to bone ([facility] [address]     Renal disease     Renal failure     Urinary incontinence     Urinary tract infection, site not specified     UTI (urinary tract infection)     Vitamin D deficiency     Wears dentures        Past Surgical History:   Procedure Laterality Date    BIOPSY PROSTATE      [id number] gl4+3.     HX HERNIA REPAIR      HX KNEE REPLACEMENT      HX ORCHIECTOMY  08/11/2016    HX OTHER SURGICAL      IMPLANTS    HX OTHER SURGICAL      TOTAL KNEE/ TOTAL HIP REPLACEMENT PRE OP ORDERSET    HX OTHER SURGICAL  08/11/2016    HERNIA REPAIR LAPAROSCOPIC    HX OTHER SURGICAL  08/11/2016    TESTICULAR PROSTHESIS INSERTION    HX RADICAL PROSTATECTOMY  12.2015       Social History     Social History    Marital status:      Spouse name: N/A    Number of children: N/A    Years of education: N/A     Social History Main Topics    Smoking status: Never Smoker    Smokeless tobacco: Never Used    Alcohol use No    Drug use: No    Sexual activity: Not Asked     Other Topics Concern    None     Social History Narrative       Family History   Problem Relation Age of Onset    Other Sister      cerebral palsy    Cancer Other      colon       Allergies   Allergen Reactions    Egg Unable to Obtain    Ketorolac Tromethamine Other (comments)     Kidney insufficient    Nsaids (Non-Steroidal Anti-Inflammatory Drug) Other (comments)    Prilosec [Omeprazole] Other (comments)     Flank and abd pain    Proton Pump Inhibitors Other (comments)       . Current Outpatient Prescriptions   Medication Sig Dispense Refill    albuterol-ipratropium (DUONEB) 2.5 mg-0.5 mg/3 ml nebu 3 mL by Nebulization route.  gabapentin (NEURONTIN) 100 mg capsule Take  by mouth three (3) times daily.  pramipexole (MIRAPEX) 0.25 mg tablet TK 1 T PO TID  5    ENTRESTO 24-26 mg tablet TK 1 T PO BID  6    furosemide (LASIX) 40 mg tablet TK 1 T PO ONCE D 20 mg a day  3    PROAIR HFA 90 mcg/actuation inhaler       ondansetron (ZOFRAN ODT) 4 mg disintegrating tablet 8 mg.  traZODone (DESYREL) 150 mg tablet TAKE 2 TABLETS EVERYDAY AT BEDTIME  0    ADVAIR DISKUS 500-50 mcg/dose diskus inhaler TAKE 1 PUFF INHALED BY MOUTH TWICE DAILY. FOR COPD  12    amitriptyline (ELAVIL) 50 mg tablet TAKE 2 TABLETS BY MOUTH EVERY NIGHT AT BEDTIME  1    QUEtiapine (SEROQUEL) 100 mg tablet 100 mg.  hydrALAZINE (APRESOLINE) 25 mg tablet ONE TABLET THREE TIMES A DAY BY MOUTH  6    raNITIdine (ZANTAC) 150 mg tablet TAKE 1 TAB BY MOUTH TWICE DAILY.   3    pravastatin (PRAVACHOL) 20 mg tablet       aspirin delayed-release 325 mg tablet Take 81 mg by mouth every six (6) hours as needed.  amLODIPine (NORVASC) 5 mg tablet Take 15 mg by mouth daily.  carvedilol (COREG) 25 mg tablet Take 25 mg by mouth two (2) times daily (with meals).  FLUoxetine (PROZAC) 20 mg capsule TK 1 C PO QAM  0    azithromycin (ZITHROMAX) 250 mg tablet 250 mg.  predniSONE (DELTASONE) 10 mg tablet 10 mg.      oxyCODONE-acetaminophen (PERCOCET) 5-325 mg per tablet Take 1-2 Tabs by mouth every four (4) hours as needed for Pain. Max Daily Amount: 12 Tabs.  6 Tab 0         Review of Systems:  HEENT: No epistaxis, no nasal drainage, no difficulty in swallowing, no redness in eyes  Respiratory: as above  Cardiovascular: no chest pain, no palpitations, no chronic leg edema, no syncope  Gastrointestinal: no abd pain, no vomiting, no diarrhea, no bleeding symptoms  Genitourinary: No urinary symptoms or hematuria  Integument/breast: No ulcers or rashes  Musculoskeletal:Neg  Neurological: No focal weakness, no seizures, no headaches  Behvioral/Psych: No anxiety, no depression  Constitutional: No fever, no chills, ++ weight loss, no night sweats     Objective:     Visit Vitals    /70 (BP 1 Location: Left arm, BP Patient Position: At rest)    Pulse (!) 56    Temp 97.8 °F (36.6 °C) (Oral)    Resp 23    Ht 5' 9\" (1.753 m)    Wt 86.6 kg (191 lb)    SpO2 97%    BMI 28.21 kg/m2        Physical Exam:   General: comfortable, no acute distress  HEENT: pupils reactive, sclera anicteric, EOM intact  Neck: No adenopathy or thyroid swelling, no lymphadenopathy or JVD, supple  CVS: S1S2 no murmurs  RS: Mod AE bilaterally, no tactile fremitus or egophony, no accessory muscle use  Abd: soft, non tender, no hepatosplenomegaly  Neuro: non focal, awake, alert  Extrm: no leg edema, clubbing or cyanosis  Skin: no rash    Data review:   Pertinent labs: CBC, BMP, LFT's  PSA- 9.7    PFT:    Date FVC% FEV1  FEV1/FVC [id number] TLC RV RV/TLC VC DLCO   7/14/2017 48 32 67 17                                              PFT:9/28/16  FVC 2.12 (51 %)  FEV1 1.29 (43 %)->+24% post BD  FEV1/FVC 61 %  TLC 80 %   %  DL/VA 63 %    SIX MINUTE WALK: 8/17/16    Interpretation: Ambulatory oximetry Does not show significant desaturation;starting O2 sat 98 %; low O2 sat 97 %;   starting HR 70 bpm; high HR 79 bpm  Dyspnea scale: beginning 3; ending 5  Total distance : 770 ft   Total time: 6 minutes      Echo: 7/10/2017:  Normal LV function normal, RV dilated. NORMAL LEFT VENTRICULAR CAVITY SIZE WITH MODERATE CONCENTRIC LEFT VENTRICULAR   HYPERTROPHY. REDUCED LEFT VENTRICULAR EJECTION FRACTION BUT EXACT EF  CANNOT BE DETERMINED WITH THIS   STUDY   MILD (STAGE I) DIASTOLIC DYSFUNCTION. MODERATELY DILATED LEFT ATRIUM. DILATED RIGHT VENTRICULAR SIZE WITH NORMAL SYSTOLIC FUNCTION. NO HEMODYNAMICALLY SIGNIFICANT VALVULAR PATHOLOGY. ESTIMATED PULMONARY ARTERY PRESSURE IS 32 MMHG. AORTIC ROOT DILATATION. NO OBVIOUS MASSES, SHUNTS OR THROMBI SEEN. Imaging:  I have personally reviewed the patients radiographs and have reviewed the reports:  CXR:2/1/17  Cardiac enlargement with no acute infiltrate. There is a moderate size hiatal hernia. Calcified hilar and mediastinal lymph nodes indicating old granulomatous disease. No acute infiltrate or edema is identified    CXR: 10/18/16 CXR independently reviewed and results discussed with pt. Improved aeration with decreasing airspace disease right lower lobe. Mild parenchymal scarring. Stable cardiomegaly. Hiatal hernia. CXR: 10/13/16:  New airspace consolidation in the lateral right midlung on the PA view suspicious for pneumonia. Stable cardiomegaly. Stable linear parenchymal scarring in the left midlung. Persistent hiatal hernia.          Patient Active Problem List   Diagnosis Code    Essential hypertension I10    COPD (chronic obstructive pulmonary disease) [address] [address] J44.9    Mason's esophagus K22.70    Multiple-type hyperlipidemia E78.4    Major depressive disorder, recurrent severe without psychotic features [address] [address] F33.2    Cardiomyopathy ([facility] [address] I42.9    Prostate cancer, [id number]  dvp Vani [id number] gl4+3(tertiary 5) psa gray 2.2 with bone mets C61    Bone Metastases from [place], C79.51    Mixed incontinence N39.46    Urinary tract infection N39.0    Chronic fatigue R53.82    Wears dentures Z97.2, K08.109    Urinary tract infection, site not specified N39.0    Urinary incontinence R32    Renal failure N19    Renal disease N28.9    Personal history of prostate cancer Z85.46    History of blood transfusion Z92.89    Hiatus hernia syndrome K44.9    Hiatal hernia K44.9    Esophageal reflux K21.9    DJD (degenerative joint disease) M19.90    Depression F32.9    Deficiency anemia D53.9    Decreased hearing H91.90    Coronary artery disease I25.10    Asthma J45.909    Anxiety F41.9    Abdominal wall hernia K43.9    Chronic low back pain M54.5, G89.29    History of surgical procedure Z98.890    Incisional hernia K43.2    Atelectasis J98.11    Pneumonia due to infectious organism J18.9    Bronchitis, chronic, simple (HCC) J41.0    Vitamin D deficiency E55.9    Major depressive disorder F32.9    HTN (hypertension) I10    HLD (hyperlipidemia) E78.5    Mason's esophagus with esophagitis K22.70, K20.9     IMPRESSION:   · Progressive SOB: multifactorial. Predominant ASTHMA-COPD  Significant Pulmonary functional impairment from  Obstructive airway component: advance asthma- COPD airway remodeling over the years with additional restriction from neuromuscular/skeletal contributors: pectus deformity wit reconstruction and hormonal metaboliceffects ? Additional role from hiatal hernia and GERD. also need to consider ? VTE -at riak due to prostrate Ca and orchiectomy.  Additional cardiovascular factors- cardiomyopathy, systolic and daistolic failure  · Cardiomyopathy  · Hiatal hernia  · Anxiety-depression  · Prostrate ca      RECOMMENDATIONS:   · Will optimize treatment for obstructive airways defect component- Add LAMA to current LABA+ICS, ( samples provided)- will seek patient assistance programs to ensure availability and compliance  · early intervention for exacerbations, control triggers- GERD? hiatal hernia factors  · No indication for additional therapy at present- Daliresp, Singulair, Prednisone, Macrolides; reserve for appropriate time/indication  · Will benefit from Pulmonary rehab- will start after completion of radiation  · Will evaluate for additional contributors ? VTE- V/Q scan, ANNA- Polysomnogram  · Await and follow up Nuclear Cardiac scan, vascular studies   · Will check PFT's and 6 min walk as clinically indicated  · Aspiration precautions and GERD treatment- will consider Gi evaluation for endoscopic treatment options  · Discussed in details plan of care and answered questions to the best of my ability. · Will follow up in 3 months     Health maintenance screens deferred to Primary care provider.      Carrington Gr MD

## 2017-07-14 NOTE — PROGRESS NOTES
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 He was initially found to have cardiomyopathy due to viral illness in . He had an EF of 15% initially. He had normal coronaries at that time. In years past he's had EF's as high as 50% but his most recent echo earlier this year showed an ejection fraction down to 35%. He's had progressive decrease in blood pressures and has had his meds reduced. He's no longer taking hydralazine and his Coreg dose has been decreasd from 50 to 25 mg bid. His main complaints are continued fatigue and shortness of breath. He had asthma as a child and hence the diagnosis of COPD  He denies cigarette smoking. He had been on Spiriva and Advair but had to stop Spiriva about 1 year back due to cost of medication. Never hospitalized for COPD/asthma exacerbations but has had several ER visits for bronchitis, Pneumonia. Admits to reflux. Denies significant cough, dry or productive. Denies nasal congestion, postnasal drainage. C/o severe leg weakness- bilateral.  C/o occasional edema LE. He has had no chest pain. He does have a history of hypertension. He has had a recent increase in hemoglobin A1C but no history of overt diabetes. He has elevated cholesterol on medications. There is family history of cardiac disease. He denies stroke, rheumatic fever, or claudication. He is currently undergoing radiation therapy for prostrate ca. Had prostrate surgery followed by orchiectomy in [id number]. No chemotherapy.     PAST SURGICAL HISTORY: Pertinent for a prostatectomy in , orchiectomy in 2016, hiatal hernia repair in  which failed, and total knee replacement on the right. MEDICAL HISTORY: Pertinent for the cardiomyopathy, hypertension, COPD with asthma, Mason's esophagus, pectus excavatum with pectoral implants. SOB:   Symptoms occur on exertion. Able to walk about 200  Feet. Cannot climb stairs. Activities of daily living are affected  Denies orthopnea/ paroxysmal nocturnal dyspnea  SOB relieved with rest, inhalers/ nebulizers    Treatment so far- Advair, duoneb  Imaging studies- CXR  Other testing- PFT, 6 min walk  Comorbid conditions include- HTN, Cardiomyopathy, Neuropathy, Mason's esophagus, Hiatal hernia, Pectus deformity  Never Smoker  Occupational exposure-none      Past Medical History:   Diagnosis Date    Abdominal wall hernia     Anxiety     Asthma     Mason's esophagus with esophagitis     Cardiomyopathy     Chronic fatigue     Chronic lung disease     CKD (chronic kidney disease), stage III     COPD (chronic obstructive pulmonary disease) ([address]     Coronary artery disease     Decreased hearing     Deficiency anemia     Depression     DJD (degenerative joint disease)     Esophageal reflux     Hiatal hernia     Hiatus hernia syndrome     History of blood transfusion     HLD (hyperlipidemia)     HTN (hypertension)     Major depressive disorder     Mixed incontinence     Personal history of prostate cancer     [facility]     Prostate cancer metastatic to bone ([facility] [facility]     Renal disease     Renal failure     Urinary incontinence     Urinary tract infection, site not specified     UTI (urinary tract infection)     Vitamin D deficiency     Wears dentures        Past Surgical History:   Procedure Laterality Date    BIOPSY PROSTATE      [id number] gl4+3.     HX HERNIA REPAIR      HX KNEE REPLACEMENT      HX ORCHIECTOMY  08/11/2016    HX OTHER SURGICAL      IMPLANTS    HX OTHER SURGICAL      TOTAL KNEE/ TOTAL HIP REPLACEMENT PRE OP ORDERSET    HX OTHER SURGICAL  08/11/2016    HERNIA REPAIR LAPAROSCOPIC    HX OTHER SURGICAL  08/11/2016    TESTICULAR PROSTHESIS INSERTION    HX RADICAL PROSTATECTOMY  12.2015       Social History     Social History    Marital status:      Spouse name: N/A    Number of children: N/A    Years of education: N/A     Social History Main Topics    Smoking status: Never Smoker    Smokeless tobacco: Never Used    Alcohol use No    Drug use: No    Sexual activity: Not Asked     Other Topics Concern    None     Social History Narrative       Family History   Problem Relation Age of Onset    Other Sister      cerebral palsy    Cancer Other      colon       Allergies   Allergen Reactions    Egg Unable to Obtain    Ketorolac Tromethamine Other (comments)     Kidney insufficient    Nsaids (Non-Steroidal Anti-Inflammatory Drug) Other (comments)    Prilosec [Omeprazole] Other (comments)     Flank and abd pain    Proton Pump Inhibitors Other (comments)       . Current Outpatient Prescriptions   Medication Sig Dispense Refill    albuterol-ipratropium (DUONEB) 2.5 mg-0.5 mg/3 ml nebu 3 mL by Nebulization route.  gabapentin (NEURONTIN) 100 mg capsule Take  by mouth three (3) times daily.  pramipexole (MIRAPEX) 0.25 mg tablet TK 1 T PO TID  5    ENTRESTO 24-26 mg tablet TK 1 T PO BID  6    furosemide (LASIX) 40 mg tablet TK 1 T PO ONCE D 20 mg a day  3    PROAIR HFA 90 mcg/actuation inhaler       ondansetron (ZOFRAN ODT) 4 mg disintegrating tablet 8 mg.  traZODone (DESYREL) 150 mg tablet TAKE 2 TABLETS EVERYDAY AT BEDTIME  0    ADVAIR DISKUS 500-50 mcg/dose diskus inhaler TAKE 1 PUFF INHALED BY MOUTH TWICE DAILY. FOR COPD  12    amitriptyline (ELAVIL) 50 mg tablet TAKE 2 TABLETS BY MOUTH EVERY NIGHT AT BEDTIME  1    QUEtiapine (SEROQUEL) 100 mg tablet 100 mg.  hydrALAZINE (APRESOLINE) 25 mg tablet ONE TABLET THREE TIMES A DAY BY MOUTH  6    raNITIdine (ZANTAC) 150 mg tablet TAKE 1 TAB BY MOUTH TWICE DAILY.   3    pravastatin (PRAVACHOL) 20 mg tablet       aspirin delayed-release 325 mg tablet Take 81 mg by mouth every six (6) hours as needed.  amLODIPine (NORVASC) 5 mg tablet Take 15 mg by mouth daily.  carvedilol (COREG) 25 mg tablet Take 25 mg by mouth two (2) times daily (with meals).  FLUoxetine (PROZAC) 20 mg capsule TK 1 C PO QAM  0    azithromycin (ZITHROMAX) 250 mg tablet 250 mg.  predniSONE (DELTASONE) 10 mg tablet 10 mg.      oxyCODONE-acetaminophen (PERCOCET) 5-325 mg per tablet Take 1-2 Tabs by mouth every four (4) hours as needed for Pain. Max Daily Amount: 12 Tabs.  6 Tab 0         Review of Systems:  HEENT: No epistaxis, no nasal drainage, no difficulty in swallowing, no redness in eyes  Respiratory: as above  Cardiovascular: no chest pain, no palpitations, no chronic leg edema, no syncope  Gastrointestinal: no abd pain, no vomiting, no diarrhea, no bleeding symptoms  Genitourinary: No urinary symptoms or hematuria  Integument/breast: No ulcers or rashes  Musculoskeletal:Neg  Neurological: No focal weakness, no seizures, no headaches  Behvioral/Psych: No anxiety, no depression  Constitutional: No fever, no chills, ++ weight loss, no night sweats     Objective:     Visit Vitals    /70 (BP 1 Location: Left arm, BP Patient Position: At rest)    Pulse (!) 56    Temp 97.8 °F (36.6 °C) (Oral)    Resp 23    Ht 5' 9\" (1.753 m)    Wt 86.6 kg (191 lb)    SpO2 97%    BMI 28.21 kg/m2        Physical Exam:   General: comfortable, no acute distress  HEENT: pupils reactive, sclera anicteric, EOM intact  Neck: No adenopathy or thyroid swelling, no lymphadenopathy or JVD, supple  CVS: S1S2 no murmurs  RS: Mod AE bilaterally, no tactile fremitus or egophony, no accessory muscle use  Abd: soft, non tender, no hepatosplenomegaly  Neuro: non focal, awake, alert  Extrm: no leg edema, clubbing or cyanosis  Skin: no rash    Data review:   Pertinent labs: CBC, BMP, LFT's  PSA- 9.7    PFT:    Date FVC% FEV1  FEV1/FVC [id number] TLC RV RV/TLC VC DLCO   7/14/2017 48 32 67 17                                              PFT:9/28/16  FVC 2.12 (51 %)  FEV1 1.29 (43 %)->+24% post BD  FEV1/FVC 61 %  TLC 80 %   %  DL/VA 63 %    SIX MINUTE WALK: 8/17/16    Interpretation: Ambulatory oximetry Does not show significant desaturation;starting O2 sat 98 %; low O2 sat 97 %;   starting HR 70 bpm; high HR 79 bpm  Dyspnea scale: beginning 3; ending 5  Total distance : 770 ft   Total time: 6 minutes      Echo: 7/10/2017:  Normal LV function normal, RV dilated. NORMAL LEFT VENTRICULAR CAVITY SIZE WITH MODERATE CONCENTRIC LEFT VENTRICULAR   HYPERTROPHY. REDUCED LEFT VENTRICULAR EJECTION FRACTION BUT EXACT EF  CANNOT BE DETERMINED WITH THIS   STUDY   MILD (STAGE I) DIASTOLIC DYSFUNCTION. MODERATELY DILATED LEFT ATRIUM. DILATED RIGHT VENTRICULAR SIZE WITH NORMAL SYSTOLIC FUNCTION. NO HEMODYNAMICALLY SIGNIFICANT VALVULAR PATHOLOGY. ESTIMATED PULMONARY ARTERY PRESSURE IS 32 MMHG. AORTIC ROOT DILATATION. NO OBVIOUS MASSES, SHUNTS OR THROMBI SEEN. Imaging:  I have personally reviewed the patients radiographs and have reviewed the reports:  CXR:2/1/17  Cardiac enlargement with no acute infiltrate. There is a moderate size hiatal hernia. Calcified hilar and mediastinal lymph nodes indicating old granulomatous disease. No acute infiltrate or edema is identified    CXR: 10/18/16 CXR independently reviewed and results discussed with pt. Improved aeration with decreasing airspace disease right lower lobe. Mild parenchymal scarring. Stable cardiomegaly. Hiatal hernia. CXR: 10/13/16:  New airspace consolidation in the lateral right midlung on the PA view suspicious for pneumonia. Stable cardiomegaly. Stable linear parenchymal scarring in the left midlung. Persistent hiatal hernia.          Patient Active Problem List   Diagnosis Code    Essential hypertension I10    COPD (chronic obstructive pulmonary disease) ([facility] J44.9    Mason's esophagus K22.70    Multiple-type hyperlipidemia E78.4    Major depressive disorder, recurrent severe without psychotic features ([facility] F33.2    Cardiomyopathy ([facility] I42.9    Prostate cancer, [id number]  dvp Vani [id number] gl4+3(tertiary 5) psa gray 2.2 with bone mets C61    Bone Metastases from [place], C79.51    Mixed incontinence N39.46    Urinary tract infection N39.0    Chronic fatigue R53.82    Wears dentures Z97.2, K08.109    Urinary tract infection, site not specified N39.0    Urinary incontinence R32    Renal failure N19    Renal disease N28.9    Personal history of prostate cancer Z85.46    History of blood transfusion Z92.89    Hiatus hernia syndrome K44.9    Hiatal hernia K44.9    Esophageal reflux K21.9    DJD (degenerative joint disease) M19.90    Depression F32.9    Deficiency anemia D53.9    Decreased hearing H91.90    Coronary artery disease I25.10    Asthma J45.909    Anxiety F41.9    Abdominal wall hernia K43.9    Chronic low back pain M54.5, G89.29    History of surgical procedure Z98.890    Incisional hernia K43.2    Atelectasis J98.11    Pneumonia due to infectious organism J18.9    Bronchitis, chronic, simple (HCC) J41.0    Vitamin D deficiency E55.9    Major depressive disorder F32.9    HTN (hypertension) I10    HLD (hyperlipidemia) E78.5    Mason's esophagus with esophagitis K22.70, K20.9     IMPRESSION:   · Progressive SOB: multifactorial. Predominant ASTHMA-COPD  Significant Pulmonary functional impairment from  Obstructive airway component: advance asthma- COPD airway remodeling over the years with additional restriction from neuromuscular/skeletal contributors: pectus deformity wit reconstruction and hormonal metaboliceffects ? Additional role from hiatal hernia and GERD. also need to consider ? VTE -at riak due to prostrate Ca and orchiectomy.  Additional cardiovascular factors- cardiomyopathy, systolic and daistolic failure  · Cardiomyopathy  · Hiatal hernia  · Anxiety-depression  · Prostrate ca      RECOMMENDATIONS:   · Will optimize treatment for obstructive airways defect component- Add LAMA to current LABA+ICS, ( samples provided)- will seek patient assistance programs to ensure availability and compliance  · early intervention for exacerbations, control triggers- GERD? hiatal hernia factors  · No indication for additional therapy at present- Daliresp, Singulair, Prednisone, Macrolides; reserve for appropriate time/indication  · Will benefit from Pulmonary rehab- will start after completion of radiation  · Will evaluate for additional contributors ? VTE- V/Q scan, ANNA- Polysomnogram  · Await and follow up Nuclear Cardiac scan, vascular studies   · Will check PFT's and 6 min walk as clinically indicated  · Aspiration precautions and GERD treatment- will consider Gi evaluation for endoscopic treatment options  · Discussed in details plan of care and answered questions to the best of my ability. · Will follow up in 3 months     Health maintenance screens deferred to Primary care provider.      Elaina Reyes MD

## 2017-07-14 NOTE — LETTER
2017 11:37 AM 
 
Patient:  Jaqui Dewitt YOB: 1945 Date of Visit: 2017 Dear Michel Li, ULISES 
[address] [address] VIA Facsimile: [phone] 
 : Thank you for referring Mr. Maria Del Rosario Gross to me for evaluation/treatment. Below are the relevant portions of my assessment and plan of care. [facility] Pulmonary, Critical Care, and Sleep Medicine Pulmonary Office Initial Consultation Name: Jaqui Dewitt : 1945 Date: 2017 Subjective:  
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 He was initially found to have cardiomyopathy due to viral illness in . He had an EF of 15% initially. He had normal coronaries at that time. In years past he's had EF's as high as 50% but his most recent echo earlier this year showed an ejection fraction down to 35%. He's had progressive decrease in blood pressures and has had his meds reduced. He's no longer taking hydralazine and his Coreg dose has been decreasd from 50 to 25 mg bid. His main complaints are continued fatigue and shortness of breath. He had asthma as a child and hence the diagnosis of COPD He denies cigarette smoking. He had been on Spiriva and Advair but had to stop Spiriva about 1 year back due to cost of medication. Never hospitalized for COPD/asthma exacerbations but has had several ER visits for bronchitis, Pneumonia. Admits to reflux. Denies significant cough, dry or productive. Denies nasal congestion, postnasal drainage. C/o severe leg weakness- bilateral. 
C/o occasional edema LE. He has had no chest pain. He does have a history of hypertension. He has had a recent increase in hemoglobin A1C but no history of overt diabetes. He has elevated cholesterol on medications.  There is family history of cardiac disease. He denies stroke, rheumatic fever, or claudication. He is currently undergoing radiation therapy for prostrate ca. Had prostrate surgery followed by orchiectomy in [id number]. No chemotherapy. PAST SURGICAL HISTORY: Pertinent for a prostatectomy in 2015, orchiectomy in 2016, hiatal hernia repair in 2013 which failed, and total knee replacement on the right. MEDICAL HISTORY: Pertinent for the cardiomyopathy, hypertension, COPD with asthma, Mason's esophagus, pectus excavatum with pectoral implants. SOB:  
Symptoms occur on exertion. Able to walk about 200  Feet. Cannot climb stairs. Activities of daily living are affected Denies orthopnea/ paroxysmal nocturnal dyspnea SOB relieved with rest, inhalers/ nebulizers Treatment so far- Advair, duoneb Imaging studies- CXR Other testing- PFT, 6 min walk Comorbid conditions include- HTN, Cardiomyopathy, Neuropathy, Mason's esophagus, Hiatal hernia, Pectus deformity Never Smoker Occupational exposure-none Past Medical History:  
Diagnosis Date  Abdominal wall hernia  Anxiety  Asthma  Mason's esophagus with esophagitis  Cardiomyopathy  Chronic fatigue  Chronic lung disease  CKD (chronic kidney disease), stage III  COPD (chronic obstructive pulmonary disease) (HCC)  Coronary artery disease  Decreased hearing  Deficiency anemia  Depression  DJD (degenerative joint disease)  Esophageal reflux  Hiatal hernia   
 Hiatus hernia syndrome  History of blood transfusion  HLD (hyperlipidemia)  HTN (hypertension)  Major depressive disorder  Mixed incontinence  Personal history of prostate cancer  Prostate cancer ([facility]  Prostate cancer metastatic to bone ([facility]  Renal disease  Renal failure  Urinary incontinence  Urinary tract infection, site not specified  UTI (urinary tract infection)  Vitamin D deficiency  Wears dentures Past Surgical History:  
Procedure Laterality Date  BIOPSY PROSTATE    
 [id number] gl4+3.  HX HERNIA REPAIR    
 HX KNEE REPLACEMENT    
 HX ORCHIECTOMY  08/11/2016  HX OTHER SURGICAL IMPLANTS  
 HX OTHER SURGICAL TOTAL KNEE/ TOTAL HIP REPLACEMENT PRE OP ORDERSET  
 HX OTHER SURGICAL  08/11/2016 HERNIA REPAIR LAPAROSCOPIC  
 HX OTHER SURGICAL  08/11/2016 TESTICULAR PROSTHESIS INSERTION  
 HX RADICAL PROSTATECTOMY  12.2015 Social History Social History  Marital status:  Spouse name: N/A  
 Number of children: N/A  
 Years of education: N/A Social History Main Topics  Smoking status: Never Smoker  Smokeless tobacco: Never Used  Alcohol use No  
 Drug use: No  
 Sexual activity: Not Asked Other Topics Concern  None Social History Narrative Family History Problem Relation Age of Onset  Other Sister   
  cerebral palsy  Cancer Other   
  colon Allergies Allergen Reactions  Egg Unable to Consolidated Abdiel  Ketorolac Tromethamine Other (comments) Kidney insufficient  Nsaids (Non-Steroidal Anti-Inflammatory Drug) Other (comments)  Prilosec [Omeprazole] Other (comments) Flank and abd pain  Proton Pump Inhibitors Other (comments) Annamary Ripa Current Outpatient Prescriptions Medication Sig Dispense Refill  albuterol-ipratropium (DUONEB) 2.5 mg-0.5 mg/3 ml nebu 3 mL by Nebulization route.  gabapentin (NEURONTIN) 100 mg capsule Take  by mouth three (3) times daily.  pramipexole (MIRAPEX) 0.25 mg tablet TK 1 T PO TID  5  
 ENTRESTO 24-26 mg tablet TK 1 T PO BID  6  
 furosemide (LASIX) 40 mg tablet TK 1 T PO ONCE D 20 mg a day  3  
 PROAIR HFA 90 mcg/actuation inhaler  ondansetron (ZOFRAN ODT) 4 mg disintegrating tablet 8 mg.     
 traZODone (DESYREL) 150 mg tablet TAKE 2 TABLETS EVERYDAY AT BEDTIME  0  
  ADVAIR DISKUS 500-50 mcg/dose diskus inhaler TAKE 1 PUFF INHALED BY MOUTH TWICE DAILY. FOR COPD  12  
 amitriptyline (ELAVIL) 50 mg tablet TAKE 2 TABLETS BY MOUTH EVERY NIGHT AT BEDTIME  1  
 QUEtiapine (SEROQUEL) 100 mg tablet 100 mg.  hydrALAZINE (APRESOLINE) 25 mg tablet ONE TABLET THREE TIMES A DAY BY MOUTH  6  
 raNITIdine (ZANTAC) 150 mg tablet TAKE 1 TAB BY MOUTH TWICE DAILY. 3  
 pravastatin (PRAVACHOL) 20 mg tablet  aspirin delayed-release 325 mg tablet Take 81 mg by mouth every six (6) hours as needed.  amLODIPine (NORVASC) 5 mg tablet Take 15 mg by mouth daily.  carvedilol (COREG) 25 mg tablet Take 25 mg by mouth two (2) times daily (with meals).  FLUoxetine (PROZAC) 20 mg capsule TK 1 C PO QAM  0  
 azithromycin (ZITHROMAX) 250 mg tablet 250 mg.  predniSONE (DELTASONE) 10 mg tablet 10 mg.    
 oxyCODONE-acetaminophen (PERCOCET) 5-325 mg per tablet Take 1-2 Tabs by mouth every four (4) hours as needed for Pain. Max Daily Amount: 12 Tabs. 6 Tab 0 Review of Systems: 
HEENT: No epistaxis, no nasal drainage, no difficulty in swallowing, no redness in eyes Respiratory: as above Cardiovascular: no chest pain, no palpitations, no chronic leg edema, no syncope Gastrointestinal: no abd pain, no vomiting, no diarrhea, no bleeding symptoms Genitourinary: No urinary symptoms or hematuria Integument/breast: No ulcers or rashes Musculoskeletal:Neg 
Neurological: No focal weakness, no seizures, no headaches Behvioral/Psych: No anxiety, no depression Constitutional: No fever, no chills, ++ weight loss, no night sweats Objective:  
 
Visit Vitals  /70 (BP 1 Location: Left arm, BP Patient Position: At rest)  Pulse (!) 56  Temp 97.8 °F (36.6 °C) (Oral)  Resp 23  
 Ht 5' 9\" (1.753 m)  Wt 86.6 kg (191 lb)  SpO2 97%  BMI 28.21 kg/m2 Physical Exam:  
General: comfortable, no acute distress HEENT: pupils reactive, sclera anicteric, EOM intact Neck: No adenopathy or thyroid swelling, no lymphadenopathy or JVD, supple CVS: S1S2 no murmurs RS: Mod AE bilaterally, no tactile fremitus or egophony, no accessory muscle use Abd: soft, non tender, no hepatosplenomegaly Neuro: non focal, awake, alert Extrm: no leg edema, clubbing or cyanosis Skin: no rash Data review:  
Pertinent labs: CBC, BMP, LFT's 
PSA- 9.7 PFT: 
 
Date FVC% FEV1  FEV1/FVC UAL51-63 TLC RV RV/TLC VC DLCO  
7/14/2017 48 32 67 17 PFT:9/28/16 FVC 2.12 (51 %) FEV1 1.29 (43 %)->+24% post BD FEV1/FVC 61 % TLC 80 %  % DL/VA 63 % SIX MINUTE WALK: 8/17/16 Interpretation: Ambulatory oximetry Does not show significant desaturation;starting O2 sat 98 %; low O2 sat 97 %;  
starting HR 70 bpm; high HR 79 bpm 
Dyspnea scale: beginning 3; ending 5 Total distance : 770 ft Total time: 6 minutes Echo: 7/10/2017: 
Normal LV function normal, RV dilated. NORMAL LEFT VENTRICULAR CAVITY SIZE WITH MODERATE CONCENTRIC LEFT VENTRICULAR HYPERTROPHY. REDUCED LEFT VENTRICULAR EJECTION FRACTION BUT EXACT EF  CANNOT BE DETERMINED WITH THIS 
 STUDY MILD (STAGE I) DIASTOLIC DYSFUNCTION. MODERATELY DILATED LEFT ATRIUM. DILATED RIGHT VENTRICULAR SIZE WITH NORMAL SYSTOLIC FUNCTION. NO HEMODYNAMICALLY SIGNIFICANT VALVULAR PATHOLOGY. ESTIMATED PULMONARY ARTERY PRESSURE IS 32 MMHG. AORTIC ROOT DILATATION. NO OBVIOUS MASSES, SHUNTS OR THROMBI SEEN. Imaging: 
I have personally reviewed the patients radiographs and have reviewed the reports: 
CXR:2/1/17 Cardiac enlargement with no acute infiltrate. There is a moderate size hiatal hernia. Calcified hilar and mediastinal lymph nodes indicating old granulomatous disease. No acute infiltrate or edema is identified CXR: 10/18/16 CXR independently reviewed and results discussed with pt. Improved aeration with decreasing airspace disease right lower lobe. Mild parenchymal scarring. Stable cardiomegaly. Hiatal hernia. CXR: 10/13/16: 
New airspace consolidation in the lateral right midlung on the PA view suspicious for pneumonia. Stable cardiomegaly. Stable linear parenchymal scarring in the left midlung. Persistent hiatal hernia. Patient Active Problem List  
Diagnosis Code  Essential hypertension I10  
 COPD (chronic obstructive pulmonary disease) (HCC) J44.9  Mason's esophagus K22.70  Multiple-type hyperlipidemia E78.4  Major depressive disorder, recurrent severe without psychotic features ([address] F33.2  Cardiomyopathy ([address] I42.9  Prostate cancer, 308558  dvp Vani [id number] gl4+3(tertiary 5) psa gray 2.2 with bone mets C61  Bone Metastases from Prostate Cancer C61, C79.51  
 Mixed incontinence N39.46  
 Urinary tract infection N39.0  Chronic fatigue R53.82  Wears dentures Z97.2, V94.308  Urinary tract infection, site not specified N39.0  Urinary incontinence R32  Renal failure N19  
 Renal disease N28.9  Personal history of prostate cancer Z85.46  
 History of blood transfusion Z92.89  
 Hiatus hernia syndrome K44.9  Hiatal hernia K44.9  Esophageal reflux K21.9  DJD (degenerative joint disease) M19.90  Depression F32.9  Deficiency anemia D53.9  Decreased hearing H91.90  Coronary artery disease I25.10  Asthma J45.909  Anxiety F41.9  Abdominal wall hernia K43.9  Chronic low back pain M54.5, G89.29  
 History of surgical procedure Z98.890  
 Incisional hernia K43.2  Atelectasis J98.11  
 Pneumonia due to infectious organism J18.9  Bronchitis, chronic, simple ([address] J41.0  Vitamin D deficiency E55.9  Major depressive disorder F32.9  
 HTN (hypertension) I10  
 HLD (hyperlipidemia) E78.5  Mason's esophagus with esophagitis K22.70, K20.9 IMPRESSION:  
 · Progressive SOB: multifactorial. Predominant ASTHMA-COPD Significant Pulmonary functional impairment from  Obstructive airway component: advance asthma- COPD airway remodeling over the years with additional restriction from neuromuscular/skeletal contributors: pectus deformity wit reconstruction and hormonal metaboliceffects ? Additional role from hiatal hernia and GERD. also need to consider ? VTE -at riak due to prostrate Ca and orchiectomy. Additional cardiovascular factors- cardiomyopathy, systolic and daistolic failure · Cardiomyopathy · Hiatal hernia · Anxiety-depression · Prostrate ca RECOMMENDATIONS:  
· Will optimize treatment for obstructive airways defect component- Add LAMA to current LABA+ICS, ( samples provided)- will seek patient assistance programs to ensure availability and compliance · early intervention for exacerbations, control triggers- GERD? hiatal hernia factors · No indication for additional therapy at present- Daliresp, Singulair, Prednisone, Macrolides; reserve for appropriate time/indication · Will benefit from Pulmonary rehab- will start after completion of radiation · Will evaluate for additional contributors ? VTE- V/Q scan, ANNA- Polysomnogram 
· Await and follow up Nuclear Cardiac scan, vascular studies · Will check PFT's and 6 min walk as clinically indicated · Aspiration precautions and GERD treatment- will consider Gi evaluation for endoscopic treatment options · Discussed in details plan of care and answered questions to the best of my ability. · Will follow up in 3 months Health maintenance screens deferred to Primary care provider. Bisi Vallejo MD 
 
 
 
 
 
 
If you have questions, please do not hesitate to call me. I look forward to following Mr. Baeza along with you.  
 
 
 
Sincerely, 
 
 
Bisi Vallejo MD

## 2017-07-14 NOTE — PATIENT INSTRUCTIONS
Please call us with progress in 2 weeks. Forward results of nuclear scan( heart)  Will order V/Q scan in 2 weeks  Pulmonary rehab in future after completion of radiation therapy.

## 2017-07-14 NOTE — MR AVS SNAPSHOT
Visit Information Date & Time Provider Department Dept. Phone Encounter #  
 7/14/2017 10:00 AM Alley Heredia MD [facility] [id number] Follow-up Instructions Return in about 3 months (around 10/14/2017). Your Appointments 8/9/2017  9:50 AM  
ESTABLISHED PATIENT with Wiliam Pena MD  
[facility] ([address]) Appt Note: Follow up in 10-12 weeks after Proton Therapy with same day PSA. [address] [facility] [address], [place] [address] [facility] [address]  
  
   
 [address] [facility], [address] [place] [address] Upcoming Health Maintenance Date Due Hepatitis C Screening 1945 DTaP/Tdap/Td series (1 - Tdap) 8/28/1966 FOBT Q 1 YEAR AGE 50-75 8/28/1995 ZOSTER VACCINE AGE 60> 8/28/2005 GLAUCOMA SCREENING Q2Y 8/28/2010 MEDICARE YEARLY EXAM 8/28/2010 INFLUENZA AGE 9 TO ADULT 8/1/2017 Pneumococcal 65+ High/Highest Risk (2 of 2 - PCV13) 8/25/2017 Allergies as of 7/14/2017  Review Complete On: 7/14/2017 By: Alley Heredia MD  
  
 Severity Noted Reaction Type Reactions Egg  08/02/2014    Unable to Obtain Ketorolac Tromethamine  09/06/2009    Other (comments) Kidney insufficient Nsaids (Non-steroidal Anti-inflammatory Drug)  09/06/2009    Other (comments) Prilosec [Omeprazole]  09/08/2016    Other (comments) Flank and abd pain Proton Pump Inhibitors  09/06/2016    Other (comments) Current Immunizations  Never Reviewed Name Date Influenza Vaccine Whole 1/3/2011 Pneumococcal Polysaccharide (PPSV-23) 8/25/2016 Not reviewed this visit You Were Diagnosed With   
  
 Codes Comments Uncomplicated asthma, unspecified asthma severity     ICD-10-CM: J45.909 ICD-9-CM: 493.90 Chronic obstructive pulmonary disease, unspecified COPD type ([facility]     ICD-10-CM: J44.9 ICD-9-CM: 883 Vitals BP Pulse Temp Resp Height(growth percentile) Weight(growth percentile) 120/70 (BP 1 Location: Left arm, BP Patient Position: At rest) (!) 56 97.8 °F (36.6 °C) (Oral) 23 5' 9\" (1.753 m) 191 lb (86.6 kg) SpO2 BMI Smoking Status 97% 28.21 kg/m2 Never Smoker Vitals History BMI and BSA Data Body Mass Index Body Surface Area  
 28.21 kg/m 2 2.05 m 2 [phone] Your Updated Medication List  
  
   
This list is accurate as of: 7/14/17 10:34 AM.  Always use your most recent med list.  
  
  
  
  
 ADVAIR DISKUS 500-50 mcg/dose diskus inhaler Generic drug:  fluticasone-salmeterol TAKE 1 PUFF INHALED BY MOUTH TWICE DAILY. FOR COPD  
  
 amitriptyline 50 mg tablet Commonly known as:  ELAVIL TAKE 2 TABLETS BY MOUTH EVERY NIGHT AT BEDTIME  
  
 amLODIPine 5 mg tablet Commonly known as:  Haig Joes Take 15 mg by mouth daily. aspirin delayed-release 325 mg tablet Take 81 mg by mouth every six (6) hours as needed. azithromycin 250 mg tablet Commonly known as:  ZITHROMAX  
250 mg. COREG 25 mg tablet Generic drug:  carvedilol Take 25 mg by mouth two (2) times daily (with meals). ENTRESTO 24-26 mg tablet Generic drug:  sacubitril-valsartan TK 1 T PO BID FLUoxetine 20 mg capsule Commonly known as:  PROzac TK 1 C PO QAM  
  
 fluticasone-vilanterol 200-25 mcg/dose inhaler Commonly known as:  BREO ELLIPTA Take 1 Puff by inhalation daily. furosemide 40 mg tablet Commonly known as:  LASIX TK 1 T PO ONCE D 20 mg a day  
  
 hydrALAZINE 25 mg tablet Commonly known as:  APRESOLINE  
ONE TABLET THREE TIMES A DAY BY MOUTH  
  
 NEURONTIN 100 mg capsule Generic drug:  gabapentin Take  by mouth three (3) times daily. ondansetron 4 mg disintegrating tablet Commonly known as:  ZOFRAN ODT  
8 mg.  
  
 oxyCODONE-acetaminophen 5-325 mg per tablet Commonly known as:  PERCOCET Take 1-2 Tabs by mouth every four (4) hours as needed for Pain. Max Daily Amount: 12 Tabs. pramipexole 0.25 mg tablet Commonly known as:  MIRAPEX TK 1 T PO TID  
  
 pravastatin 20 mg tablet Commonly known as:  PRAVACHOL  
  
 predniSONE 10 mg tablet Commonly known as:  DELTASONE  
10 mg.  
  
 * PROAIR HFA 90 mcg/actuation inhaler Generic drug:  albuterol * albuterol 2.5 mg /3 mL (0.083 %) nebulizer solution Commonly known as:  PROVENTIL VENTOLIN  
3 mL by Nebulization route every four (4) hours as needed for Wheezing. QUEtiapine 100 mg tablet Commonly known as:  SEROquel 100 mg.  
  
 raNITIdine 150 mg tablet Commonly known as:  ZANTAC TAKE 1 TAB BY MOUTH TWICE DAILY. tiotropium bromide 2.5 mcg/actuation inhaler Commonly known as:  Maryelizabeth Lout Take 2 Puffs by inhalation daily. traZODone 150 mg tablet Commonly known as:  DESYREL  
TAKE 2 TABLETS EVERYDAY AT BEDTIME  
  
 * Notice: This list has 2 medication(s) that are the same as other medications prescribed for you. Read the directions carefully, and ask your doctor or other care provider to review them with you. Prescriptions Sent to Pharmacy Refills  
 albuterol (PROVENTIL VENTOLIN) 2.5 mg /3 mL (0.083 %) nebulizer solution 6 Sig: 3 mL by Nebulization route every four (4) hours as needed for Wheezing. Class: Normal  
 Pharmacy: [facility] [address] [place] [address] Ph #: [phone] Route: Nebulization We Performed the Following AMB POC PFT COMPLETE W/BRONCHODILATOR [20180 CPT(R)] Follow-up Instructions Return in about 3 months (around 10/14/2017). Patient Instructions Please call us with progress in 2 weeks. Forward results of nuclear scan( heart) Will order V/Q scan in 2 weeks Pulmonary rehab in future after completion of radiation therapy. Introducing [facility]! Flores Nam introduces [employer]ro patient portal. Now you can access parts of your medical record, email your doctor's office, and request medication refills online. 1. In your internet browser, go to [email or web address] 2. Click on the First Time User? Click Here link in the Sign In box. You will see the New Member Sign Up page. 3. Enter your Omiro Access Code exactly as it appears below. You will not need to use this code after youve completed the sign-up process. If you do not sign up before the expiration date, you must request a new code. · [employer]miro Access Code: [email or web address] Expires: 10/12/2017  8:56 AM 
 
4. Enter the last four digits of your Social Security Number (xxxx) and Date of Birth (mm/dd/yyyy) as indicated and click Submit. You will be taken to the next sign-up page. 5. Create a Omiro ID. This will be your Omiro login ID and cannot be changed, so think of one that is secure and easy to remember. 6. Create a Omiro password. You can change your password at any time. 7. Enter your Password Reset Question and Answer. This can be used at a later time if you forget your password. 8. Enter your e-mail address. You will receive e-mail notification when new information is available in [address] 9. Click Sign Up. You can now view and download portions of your medical record. 10. Click the Download Summary menu link to download a portable copy of your medical information. If you have questions, please visit the Frequently Asked Questions section of the Omiro website. Remember, Omiro is NOT to be used for urgent needs. For medical emergencies, dial 911. Now available from your iPhone and Android! Please provide this summary of care documentation to your next provider. Your primary care clinician is listed as Mei Eisenberg. If you have any questions after today's visit, please call [phone].

## 2017-07-27 ENCOUNTER — TELEPHONE (OUTPATIENT)
Dept: PULMONOLOGY | Age: 72
End: 2017-07-27

## 2017-07-27 DIAGNOSIS — R06.02 SOB (SHORTNESS OF BREATH) ON EXERTION: Primary | ICD-10-CM

## 2017-07-27 NOTE — TELEPHONE ENCOUNTER
Called patient and notified him that Dr. Tariq Gilman has ordered a VQ scan and that someone from scheduling will be calling him to schedule the test. Patient verbalized understanding.

## 2017-07-27 NOTE — PROGRESS NOTES
Patient called and informed the the order for the VQ scan and CXR has been placed by Dr Ann Nunez. shceduling should call in next 24 hrs and can be done at any [facility] facility in area.

## 2017-08-02 ENCOUNTER — HOSPITAL ENCOUNTER (OUTPATIENT)
Dept: GENERAL RADIOLOGY | Age: 72
Discharge: HOME | End: 2017-08-02
Payer: MEDICARE

## 2017-08-02 DIAGNOSIS — R06.02 SHORTNESS OF BREATH: ICD-10-CM

## 2017-08-02 PROCEDURE — 71020 XR CHEST AP LAT: CPT

## 2017-08-03 ENCOUNTER — HOSPITAL ENCOUNTER (OUTPATIENT)
Dept: NUCLEAR MEDICINE | Age: 72
Discharge: HOME | End: 2017-08-03
Attending: INTERNAL MEDICINE
Payer: MEDICARE

## 2017-08-03 DIAGNOSIS — R06.02 SOB (SHORTNESS OF BREATH) ON EXERTION: ICD-10-CM

## 2017-08-03 PROCEDURE — 78582 LUNG VENTILAT&PERFUS IMAGING: CPT

## 2017-08-04 ENCOUNTER — TELEPHONE (OUTPATIENT)
Dept: PULMONOLOGY | Age: 72
End: 2017-08-04

## 2017-08-04 NOTE — TELEPHONE ENCOUNTER
Did Dr. Clementina Paredes receive the results of the scan I did yesterday  to see whether I am having  pulmonary emboli?    Thanks

## 2017-08-09 PROBLEM — M85.80 OSTEOPENIA: Status: ACTIVE | Noted: 2017-02-23

## 2017-08-09 PROBLEM — Z87.81 HX OF FRACTURE OF RIB: Status: ACTIVE | Noted: 2017-02-23

## 2017-08-09 PROBLEM — I50.9 CHF (CONGESTIVE HEART FAILURE) (HCC): Status: ACTIVE | Noted: 2017-07-05

## 2017-08-09 PROBLEM — N18.30 CHRONIC KIDNEY DISEASE, STAGE III (MODERATE) (HCC): Status: ACTIVE | Noted: 2017-02-23

## 2017-08-09 PROBLEM — Z87.01 HX OF BACTERIAL PNEUMONIA: Status: ACTIVE | Noted: 2017-02-23

## 2017-08-09 PROBLEM — G25.81 RESTLESS LEGS SYNDROME: Status: ACTIVE | Noted: 2017-05-24

## 2017-08-09 PROBLEM — R79.89 LOW TESTOSTERONE: Status: ACTIVE | Noted: 2017-07-24

## 2017-08-09 PROBLEM — R73.03 PREDIABETES: Status: ACTIVE | Noted: 2017-07-24

## 2017-08-15 ENCOUNTER — TELEPHONE (OUTPATIENT)
Dept: PULMONOLOGY | Age: 72
End: 2017-08-15

## 2017-08-15 RX ORDER — FLUTICASONE FUROATE AND VILANTEROL 100; 25 UG/1; UG/1
1 POWDER RESPIRATORY (INHALATION) DAILY
Qty: 1 INHALER | Refills: 3 | Status: SHIPPED | OUTPATIENT
Start: 2017-08-15 | End: 2017-09-15

## 2017-08-15 NOTE — TELEPHONE ENCOUNTER
Pt wants rx sent to local pharmacy to hold over until mail order Rx for Shivam Chapman comes in the mail.

## 2017-08-29 ENCOUNTER — TELEPHONE (OUTPATIENT)
Dept: PULMONOLOGY | Age: 72
End: 2017-08-29

## 2017-08-29 NOTE — TELEPHONE ENCOUNTER
Please forward the following response to Dr. Terry Caraballo. I re reviewed the events in October- November time frame and it appears that in   October you had complaints of cough, wheezing, sputum production, dyspnea and dyspnea on exertion. Treated with antibiotics and prednisone. December CXR with new patchy opacity in LLL and hiatal hernia. January and February same symptoms and again treated with antibiotics and prednisone. In retrospect ? Recurring pneumonia dur to aspiration vs a BOOP like process. Most recent V/Q scan - low probability for PE and echo with PAP of 32 mm Hg. I do not think that any new test will ascertain presence of ? ? PE in November. Currently no chronic thromboembolism noted. Hope this helps clarify your question.

## 2017-08-29 NOTE — TELEPHONE ENCOUNTER
From     Sadia Mode      To     [facility]      Sent     8/29/2017 12:24 AM            Dear Dr. Mimi Yarbrough will be going to [facility] for treatment of my side effects from low testosterone. Something happened in 11/2016. I became  very SOB. I presume this was due to CHF. Stabilizing the CHF, only partly corrected  my SOB. However, I would like to exclude an \"old\" PE. I do not believe this is currently happening, but I think this would be helpful in their assessment. My renal functions have improved and I can tolerate contrast dye.      Could you order an appropriate test to confirm/refute an old PE. I really appreciate all the attention you have given my case.    Best Regards,

## 2017-09-15 ENCOUNTER — TELEPHONE (OUTPATIENT)
Dept: PULMONOLOGY | Age: 72
End: 2017-09-15

## 2017-09-15 RX ORDER — FLUTICASONE PROPIONATE AND SALMETEROL 500; 50 UG/1; UG/1
1 POWDER RESPIRATORY (INHALATION) 2 TIMES DAILY
Qty: 3 INHALER | Refills: 6 | Status: SHIPPED | OUTPATIENT
Start: 2017-09-15 | End: 2017-09-25 | Stop reason: SDUPTHER

## 2017-09-15 NOTE — TELEPHONE ENCOUNTER
Dear Dr. Jonn Valdivia,     I am now in the donut hole and will run out of BREO in 10 days. Please rewrite me a paper copy for a prescription , so I can send it to [facility]). However, advair discus 500/50 is slightly cheaper. If I can substitute advair for breo. please write for Advair and I will pick it up at the . If I can have 1 sample of BREO I would much appreciate it. Thank you for your time and effort.    Leslie Eldridge

## 2017-09-24 ENCOUNTER — PATIENT MESSAGE (OUTPATIENT)
Dept: PULMONOLOGY | Age: 72
End: 2017-09-24

## 2017-09-25 RX ORDER — FLUTICASONE PROPIONATE AND SALMETEROL 250; 50 UG/1; UG/1
1 POWDER RESPIRATORY (INHALATION) EVERY 12 HOURS
Qty: 1 INHALER | Refills: 0 | Status: SHIPPED | COMMUNITY
Start: 2017-09-25 | End: 2017-10-12 | Stop reason: DRUGHIGH

## 2017-09-25 RX ORDER — FLUTICASONE PROPIONATE AND SALMETEROL 500; 50 UG/1; UG/1
1 POWDER RESPIRATORY (INHALATION) EVERY 12 HOURS
Qty: 1 INHALER | Refills: 0 | Status: CANCELLED | COMMUNITY
Start: 2017-09-25

## 2017-09-25 NOTE — TELEPHONE ENCOUNTER
----- Message from Zainab Garcia sent at 9/24/2017  8:46 PM EDT -----  Regarding: Prescription Question  Contact: [phone]  Dear Dr. Arvin Denver,        Unfortunately the Advair I ordered has not arrived from the [address] [place] [address]. However,   I am entitled to one free month of BREO. Please write me a prescription I can  at your office. To conserve medication, I am occasionally skipping doses. With Spiriva, I take 1 puff qd to alternate with  1 puff bid. Thank you!

## 2017-09-26 ENCOUNTER — OFFICE VISIT (OUTPATIENT)
Dept: PULMONOLOGY | Age: 72
End: 2017-09-26

## 2017-09-26 VITALS
SYSTOLIC BLOOD PRESSURE: 90 MMHG | RESPIRATION RATE: 16 BRPM | TEMPERATURE: 97.7 F | HEART RATE: 65 BPM | BODY MASS INDEX: 28.44 KG/M2 | DIASTOLIC BLOOD PRESSURE: 62 MMHG | OXYGEN SATURATION: 97 % | WEIGHT: 192 LBS | HEIGHT: 69 IN

## 2017-09-26 DIAGNOSIS — R06.02 SOB (SHORTNESS OF BREATH): Primary | ICD-10-CM

## 2017-09-26 DIAGNOSIS — J44.9 ASTHMA-COPD OVERLAP SYNDROME (HCC): ICD-10-CM

## 2017-09-26 RX ORDER — FLUTICASONE PROPIONATE AND SALMETEROL 500; 50 UG/1; UG/1
1 POWDER RESPIRATORY (INHALATION) 2 TIMES DAILY
Qty: 1 EACH | Refills: 6 | Status: SHIPPED | OUTPATIENT
Start: 2017-09-26 | End: 2017-10-12 | Stop reason: SDUPTHER

## 2017-09-26 RX ORDER — FLUTICASONE FUROATE AND VILANTEROL 100; 25 UG/1; UG/1
1 POWDER RESPIRATORY (INHALATION) DAILY
Qty: 1 INHALER | Refills: 3 | Status: SHIPPED | OUTPATIENT
Start: 2017-09-26 | End: 2017-10-12 | Stop reason: DRUGHIGH

## 2017-09-26 NOTE — PROGRESS NOTES
[facility]  Pulmonary, Critical Care, and Sleep Medicine      Pulmonary Office visit    Name: Elzbieta Shultz     : 1945     Date: 2017        Subjective:   Patient has been referred for evaluation of: SOB    17   Feels significantly improved with combination of Spiriva and Advair. Breo helped but cost of inhalers prohibitive. Overall breathing more comfortable,  Still not able to get in deep breath. Denies nay new interval problems  Had V/Q scan completed as ordered. 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 He was initially found to have cardiomyopathy due to viral illness in . He had an EF of 15% initially. He had normal coronaries at that time. In years past he's had EF's as high as 50% but his most recent echo earlier this year showed an ejection fraction down to 35%. He's had progressive decrease in blood pressures and has had his meds reduced. He's no longer taking hydralazine and his Coreg dose has been decreasd from 50 to 25 mg bid. His main complaints are continued fatigue and shortness of breath. He had asthma as a child and hence the diagnosis of COPD  He denies cigarette smoking. He had been on Spiriva and Advair but had to stop Spiriva about 1 year back due to cost of medication. Never hospitalized for COPD/asthma exacerbations but has had several ER visits for bronchitis, Pneumonia. Admits to reflux. Denies significant cough, dry or productive. Denies nasal congestion, postnasal drainage. C/o severe leg weakness- bilateral.  C/o occasional edema LE. He has had no chest pain. He does have a history of hypertension. He has had a recent increase in hemoglobin A1C but no history of overt diabetes. He has elevated cholesterol on medications. There is family history of cardiac disease. He denies stroke, rheumatic fever, or claudication.       He is currently undergoing radiation therapy for prostrate ca. Had prostrate surgery followed by orchiectomy in [id number]. No chemotherapy. PAST SURGICAL HISTORY: Pertinent for a prostatectomy in 2015, orchiectomy in 2016, hiatal hernia repair in 2013 which failed, and total knee replacement on the right. MEDICAL HISTORY: Pertinent for the cardiomyopathy, hypertension, COPD with asthma, Mason's esophagus, pectus excavatum with pectoral implants. SOB:   Symptoms occur on exertion. Able to walk about 200  Feet. Cannot climb stairs.  Activities of daily living are affected  Denies orthopnea/ paroxysmal nocturnal dyspnea  SOB relieved with rest, inhalers/ nebulizers    Treatment so far- Advair, duoneb  Imaging studies- CXR  Other testing- PFT, 6 min walk  Comorbid conditions include- HTN, Cardiomyopathy, Neuropathy, Mason's esophagus, Hiatal hernia, Pectus deformity  Never Smoker  Occupational exposure-none    Past Medical History:   Diagnosis Date    Abdominal wall hernia     Anxiety     Asthma     Mason's esophagus with esophagitis     Cardiomyopathy     Chronic fatigue     Chronic lung disease     CKD (chronic kidney disease), stage III     COPD (chronic obstructive pulmonary disease) ([address]     Coronary artery disease     Decreased hearing     Deficiency anemia     Depression     DJD (degenerative joint disease)     Esophageal reflux     Hiatal hernia     Hiatus hernia syndrome     History of blood transfusion     HLD (hyperlipidemia)     HTN (hypertension)     Major depressive disorder     Mixed incontinence     Personal history of prostate cancer     Prostate cancer ([address]     Prostate cancer metastatic to bone ([address]     Renal disease     Renal failure     Urinary incontinence     Urinary tract infection, site not specified     UTI (urinary tract infection)     Vitamin D deficiency     Wears dentures        Past Surgical History:   Procedure Laterality Date    BIOPSY PROSTATE      [id number] gl4+3.    HX HERNIA REPAIR      HX KNEE REPLACEMENT      HX ORCHIECTOMY  08/11/2016    HX OTHER SURGICAL      IMPLANTS    HX OTHER SURGICAL      TOTAL KNEE/ TOTAL HIP REPLACEMENT PRE OP ORDERSET    HX OTHER SURGICAL  08/11/2016    HERNIA REPAIR LAPAROSCOPIC    HX OTHER SURGICAL  08/11/2016    TESTICULAR PROSTHESIS INSERTION    HX RADICAL PROSTATECTOMY  12.2015       Allergies   Allergen Reactions    Egg Unable to Obtain    Ketorolac Tromethamine Other (comments)     Kidney insufficient    Nsaids (Non-Steroidal Anti-Inflammatory Drug) Other (comments)    Prilosec [Omeprazole] Other (comments)     Flank and abd pain    Proton Pump Inhibitors Other (comments)     Current Outpatient Prescriptions   Medication Sig Dispense Refill    fluticasone-salmeterol (ADVAIR) 250-50 mcg/dose diskus inhaler Take 1 Puff by inhalation every twelve (12) hours. 1 Inhaler 0    cholecalciferol (VITAMIN D3) 1,000 unit tablet 5,000 Units.  aspirin delayed-release 81 mg tablet 81 mg.      furosemide (LASIX) 40 mg tablet 20 mg.      gabapentin (NEURONTIN) 300 mg capsule 300 mg.  pramipexole (MIRAPEX) 0.25 mg tablet 0.25 mg.  pravastatin (PRAVACHOL) 20 mg tablet TAKE 1 TABLET EVERY EVENING      tiotropium bromide (SPIRIVA RESPIMAT) 2.5 mcg/actuation inhaler 2 Puffs.  traZODone (DESYREL) 300 mg tablet 225 mg.      metFORMIN ER (GLUCOPHAGE XR) 500 mg tablet   4    QUEtiapine (SEROQUEL) 25 mg tablet TK 3 TS PO QHS  0    denosumab (PROLIA) 60 mg/mL injection 1 mL by SubCUTAneous route every 6 months. 60 mL 0    tiotropium bromide (SPIRIVA RESPIMAT) 2.5 mcg/actuation inhaler Take 2 Puffs by inhalation daily. 6 Inhaler 0    ENTRESTO 24-26 mg tablet TK 1 T PO BID  6    PROAIR HFA 90 mcg/actuation inhaler       ondansetron (ZOFRAN ODT) 4 mg disintegrating tablet 8 mg.  raNITIdine (ZANTAC) 150 mg tablet TAKE 1 TAB BY MOUTH TWICE DAILY.   3    carvedilol (COREG) 25 mg tablet Take 25 mg by mouth two (2) times daily (with meals). Review of Systems:  HEENT: No epistaxis, no nasal drainage, no difficulty in swallowing, no redness in eyes  Respiratory: as above  Cardiovascular: no chest pain, no palpitations, no chronic leg edema, no syncope  Gastrointestinal: no abd pain, no vomiting, no diarrhea, no bleeding symptoms  Genitourinary: No urinary symptoms or hematuria  Integument/breast: No ulcers or rashes  Musculoskeletal:Neg  Neurological: No focal weakness, no seizures, no headaches  Behvioral/Psych: No anxiety, no depression  Constitutional: No fever, no chills, ++ weight loss, no night sweats     Objective:     Visit Vitals    BP 90/62 (BP 1 Location: Left arm, BP Patient Position: Sitting)    Pulse 65    Temp 97.7 °F (36.5 °C) (Oral)    Resp 16    Ht 5' 9\" (1.753 m)    Wt 87.1 kg (192 lb)    SpO2 97%  Comment: [email or web address]    BMI 28.35 kg/m2        Physical Exam:   General: comfortable, no acute distress  HEENT: pupils reactive, sclera anicteric, EOM intact  Neck: No adenopathy or thyroid swelling, no lymphadenopathy or JVD, supple  CVS: S1S2 no murmurs  RS: Mod AE bilaterally, no tactile fremitus or egophony, no accessory muscle use  Abd: soft, non tender, no hepatosplenomegaly  Neuro: non focal, awake, alert  Extrm: no leg edema, clubbing or cyanosis  Skin: no rash    Data review:   Pertinent labs: CBC, BMP, LFT's  PSA- 9.7    PFT:    Date FVC% FEV1  FEV1/FVC PDM10-61 TLC RV RV/TLC VC DLCO   7/14/2017 48 32 67 17                                              PFT:9/28/16  FVC 2.12 (51 %)  FEV1 1.29 (43 %)->+24% post BD  FEV1/FVC 61 %  TLC 80 %   %  DL/VA 63 %    SIX MINUTE WALK: 8/17/16    Interpretation: Ambulatory oximetry Does not show significant desaturation;starting O2 sat 98 %; low O2 sat 97 %;   starting HR 70 bpm; high HR 79 bpm  Dyspnea scale: beginning 3; ending 5  Total distance : 770 ft   Total time: 6 minutes      Echo: 7/10/2017:  Normal LV function normal, RV dilated.    NORMAL LEFT VENTRICULAR CAVITY SIZE WITH MODERATE CONCENTRIC LEFT VENTRICULAR   HYPERTROPHY. REDUCED LEFT VENTRICULAR EJECTION FRACTION BUT EXACT EF  CANNOT BE DETERMINED WITH THIS   STUDY   MILD (STAGE I) DIASTOLIC DYSFUNCTION. MODERATELY DILATED LEFT ATRIUM. DILATED RIGHT VENTRICULAR SIZE WITH NORMAL SYSTOLIC FUNCTION. NO HEMODYNAMICALLY SIGNIFICANT VALVULAR PATHOLOGY. ESTIMATED PULMONARY ARTERY PRESSURE IS 32 MMHG. AORTIC ROOT DILATATION. NO OBVIOUS MASSES, SHUNTS OR THROMBI SEEN. Imaging:  I have personally reviewed the patients radiographs and have reviewed the reports:  V/Q scan 8/3/2017: Moderate central deposition of radiotracer on ventilatory phase images, suggestive of nonlaminar/turbulent flow, most often seen with COPD. There is no evidence of moderate or large mismatched segmental perfusion defect to indicate the presence of pulmonary embolism. IMPRESSION:     Low probability for pulmonary embolism. CXR:2/1/17  Cardiac enlargement with no acute infiltrate. There is a moderate size hiatal hernia. Calcified hilar and mediastinal lymph nodes indicating old granulomatous disease. No acute infiltrate or edema is identified    CXR: 10/18/16 CXR independently reviewed and results discussed with pt. Improved aeration with decreasing airspace disease right lower lobe. Mild parenchymal scarring. Stable cardiomegaly. Hiatal hernia. CXR: 10/13/16:  New airspace consolidation in the lateral right midlung on the PA view suspicious for pneumonia. Stable cardiomegaly. Stable linear parenchymal scarring in the left midlung. Persistent hiatal hernia.          Patient Active Problem List   Diagnosis Code    Essential hypertension I10    COPD (chronic obstructive pulmonary disease) ([address] J44.9    Mason's esophagus K22.70    Multiple-type hyperlipidemia E78.4    Major depressive disorder, recurrent severe without psychotic features ([address] F33.2    Cardiomyopathy ([address] I42.9    Prostate cancer, 428030  dvp [place] [id number] gl4+3(tertiary 5) psa gray 2.2 with bone mets C61    Bone Metastases from [place], C79.51    Mixed incontinence N39.46    Urinary tract infection N39.0    Chronic fatigue R53.82    Wears dentures Z97.2, K08.109    Urinary tract infection, site not specified N39.0    Urinary incontinence R32    Renal failure N19    Renal disease N28.9    Personal history of prostate cancer Z85.46    History of blood transfusion Z92.89    Hiatus hernia syndrome K44.9    Hiatal hernia K44.9    Esophageal reflux K21.9    DJD (degenerative joint disease) M19.90    Depression F32.9    Deficiency anemia D53.9    Decreased hearing H91.90    Coronary artery disease I25.10    Asthma J45.909    Anxiety F41.9    Abdominal wall hernia K43.9    Chronic low back pain M54.5, G89.29    History of surgical procedure Z98.890    Incisional hernia K43.2    Atelectasis J98.11    Pneumonia due to infectious organism J18.9    Bronchitis, chronic, simple ([facility]) J41.0    Vitamin D deficiency E55.9    Major depressive disorder F32.9    HTN (hypertension) I10    HLD (hyperlipidemia) E78.5    Mason's esophagus with esophagitis K22.70, K20.9    CHF (congestive heart failure) ([facility]) I50.9    Chronic kidney disease, stage III (moderate) N18.3    Hx of bacterial pneumonia Z87.01    Hx of fracture of rib Z87.81    Low testosterone E29.1    Osteopenia M85.80    Prediabetes R73.03    Restless legs syndrome G25.81     IMPRESSION:   · Progressive SOB: multifactorial. Predominant ASTHMA-COPD  Significant Pulmonary functional impairment from  Obstructive airway component: advance asthma- COPD airway remodeling over the years with additional restriction from neuromuscular/skeletal contributors: pectus deformity wit reconstruction and hormonal metaboliceffects ? Additional role from hiatal hernia and GERD. VTE ruled out with V/Q scan-low probability. Additional cardiovascular factors- cardiomyopathy, systolic and daistolic failure  · Cardiomyopathy  · Hiatal hernia  · Anxiety-depression  · Prostrate ca      RECOMMENDATIONS:   · Will continue to optimize treatment for obstructive airways defect component- Spiriva daily and to continue LABA+ICS, (breo/advair samples provided)- will seek patient assistance programs to ensure availability and compliance  · Early intervention for exacerbations, control triggers- GERD? hiatal hernia factors  · Will benefit from Pulmonary rehab- referral made  · Will check PFT's and 6 min walk as clinically indicated  · Aspiration precautions and GERD treatment- will consider GI evaluation for endoscopic treatment options  · Discussed in details plan of care and answered questions to the best of my ability. · Will follow up in 3 months     Health maintenance screens deferred to Primary care provider.      Maranda Recinos MD

## 2017-09-26 NOTE — PROGRESS NOTES
Chief Complaint   Patient presents with    COPD    Asthma     Patient here for routine follow up visit.

## 2017-09-26 NOTE — MR AVS SNAPSHOT
Visit Information Date & Time Provider Department Dept. Phone Encounter #  
 9/26/2017 12:00 PM Rochelle Zhu MD [facility] [id number] Follow-up Instructions Return in about 4 months (around 1/26/2018). Your Appointments 11/8/2017 10:30 AM  
ESTABLISHED PATIENT with Shaye Hannon MD  
[facility] ([address]) Appt Note: Return in about 3 months (around 11/9/2017) for Follow up in 3 months to evaluate symptoms and again in 6 months for Prolia. [facility] [address] [facility] [place] [address] [facility]  
  
   
 [address] [place] [phone]  
  
    
 2/14/2018 11:10 AM  
ESTABLISHED PATIENT with Shaye Hannon MD  
[facility] ([address]) Appt Note: Return in about 3 months (around 11/9/2017) for Follow up in 3 months to evaluate symptoms and again in 6 months for Prolia. [facility] [address] [facility], [place] [address] [address]  
[phone] Upcoming Health Maintenance Date Due Hepatitis C Screening 1945 DTaP/Tdap/Td series (1 - Tdap) 8/28/1966 FOBT Q 1 YEAR AGE 50-75 8/28/1995 ZOSTER VACCINE AGE 60> 6/28/2005 GLAUCOMA SCREENING Q2Y 8/28/2010 MEDICARE YEARLY EXAM 8/28/2010 INFLUENZA AGE 9 TO ADULT 8/1/2017 Pneumococcal 65+ High/Highest Risk (2 of 2 - PCV13) 8/25/2017 Allergies as of 9/26/2017  Review Complete On: 9/26/2017 By: Rochelle Zhu MD  
  
 Severity Noted Reaction Type Reactions Egg  08/02/2014    Unable to Obtain Ketorolac Tromethamine  09/06/2009    Other (comments) Kidney insufficient Nsaids (Non-steroidal Anti-inflammatory Drug)  09/06/2009    Other (comments) Prilosec [Omeprazole]  09/08/2016    Other (comments) Flank and abd pain Proton Pump Inhibitors  09/06/2016    Other (comments) Current Immunizations  Never Reviewed Name Date Influenza Vaccine Whole 1/3/2011 Pneumococcal Polysaccharide (PPSV-23) 8/25/2016 Not reviewed this visit Vitals BP Pulse Temp Resp Height(growth percentile) Weight(growth percentile) 90/62 (BP 1 Location: Left arm, BP Patient Position: Sitting) 65 97.7 °F (36.5 °C) (Oral) 16 5' 9\" (1.753 m) 192 lb (87.1 kg) SpO2 BMI Smoking Status 97% 28.35 kg/m2 Never Smoker BMI and BSA Data Body Mass Index Body Surface Area  
 28.35 kg/m 2 2.06 m 2 Preferred Pharmacy Pharmacy Name Phone Med [phone] [facility], [facility] Trev Mcmullen [facility] 411 [phone] Your Updated Medication List  
  
   
This list is accurate as of: 9/26/17 12:28 PM.  Always use your most recent med list.  
  
  
  
  
 aspirin delayed-release 81 mg tablet 81 mg.  
  
 cholecalciferol 1,000 unit tablet Commonly known as:  VITAMIN D3  
5,000 Units. COREG 25 mg tablet Generic drug:  carvedilol Take 25 mg by mouth two (2) times daily (with meals). denosumab 60 mg/mL injection Commonly known as:  Nayla Markham 1 mL by SubCUTAneous route every 6 months. ENTRESTO 24-26 mg tablet Generic drug:  sacubitril-valsartan TK 1 T PO BID * fluticasone-salmeterol 250-50 mcg/dose diskus inhaler Commonly known as:  ADVAIR Take 1 Puff by inhalation every twelve (12) hours. * fluticasone-salmeterol 500-50 mcg/dose diskus inhaler Commonly known as:  ADVAIR Take 1 Puff by inhalation two (2) times a day. fluticasone-vilanterol 100-25 mcg/dose inhaler Commonly known as:  BREO ELLIPTA Take 1 Puff by inhalation daily. furosemide 40 mg tablet Commonly known as:  LASIX  
20 mg.  
  
 gabapentin 300 mg capsule Commonly known as:  NEURONTIN  
300 mg.  
  
 metFORMIN  mg tablet Commonly known as:  GLUCOPHAGE XR  
  
 ondansetron 4 mg disintegrating tablet Commonly known as:  ZOFRAN ODT  
8 mg. pramipexole 0.25 mg tablet Commonly known as:  MIRAPEX  
0.25 mg.  
  
 pravastatin 20 mg tablet Commonly known as:  PRAVACHOL  
TAKE 1 TABLET EVERY EVENING  
  
 PROAIR HFA 90 mcg/actuation inhaler Generic drug:  albuterol QUEtiapine 25 mg tablet Commonly known as:  SEROquel TK 3 TS PO QHS  
  
 raNITIdine 150 mg tablet Commonly known as:  ZANTAC TAKE 1 TAB BY MOUTH TWICE DAILY. * tiotropium bromide 2.5 mcg/actuation inhaler Commonly known as:  [place] Julián Take 2 Puffs by inhalation daily. * tiotropium bromide 2.5 mcg/actuation inhaler Commonly known as:  SPIRIVA RESPIMAT  
2 Puffs. traZODone 300 mg tablet Commonly known as:  DESYREL  
225 mg.  
  
 * Notice: This list has 4 medication(s) that are the same as other medications prescribed for you. Read the directions carefully, and ask your doctor or other care provider to review them with you. Prescriptions Printed Refills  
 fluticasone-vilanterol (BREO ELLIPTA) 100-25 mcg/dose inhaler 3 Sig: Take 1 Puff by inhalation daily. Class: Print Route: Inhalation  
 fluticasone-salmeterol (ADVAIR) 500-50 mcg/dose diskus inhaler 6 Sig: Take 1 Puff by inhalation two (2) times a day. Class: Print Route: Inhalation Follow-up Instructions Return in about 4 months (around 1/26/2018). Introducing [facility] & [facility] SERVICES! Dear Kendrick Frank: Thank you for requesting a [employer] account. Our records indicate that you already have an active [employer] account. You can access your account anytime at [email or web address] Did you know that you can access your hospital and ER discharge instructions at any time in [employer]? You can also review all of your test results from your hospital stay or ER visit. Additional Information If you have questions, please visit the Frequently Asked Questions section of the [employer] website at [email or web address] Remember, [employer] is NOT to be used for urgent needs. For medical emergencies, dial 911. Now available from your iPhone and Android! Please provide this summary of care documentation to your next provider. Your primary care clinician is listed as Juarez Gallego. If you have any questions after today's visit, please call [phone].

## 2017-09-27 ENCOUNTER — TELEPHONE (OUTPATIENT)
Dept: PULMONOLOGY | Age: 72
End: 2017-09-27

## 2017-09-27 NOTE — TELEPHONE ENCOUNTER
Pulmonary Rehab called patient and left a message about the program. Will follow up.     Thank you,  Yonny Hernandez

## 2017-10-12 ENCOUNTER — TELEPHONE (OUTPATIENT)
Dept: PULMONOLOGY | Age: 72
End: 2017-10-12

## 2017-10-12 RX ORDER — FLUTICASONE PROPIONATE AND SALMETEROL 500; 50 UG/1; UG/1
1 POWDER RESPIRATORY (INHALATION) 2 TIMES DAILY
Qty: 1 EACH | Refills: 3 | Status: SHIPPED | OUTPATIENT
Start: 2017-10-12 | End: 2018-02-23 | Stop reason: SDUPTHER

## 2017-10-12 NOTE — TELEPHONE ENCOUNTER
From     Gerry Jansen      To     [facility]      Sent     10/12/2017 12:11 AM            please send a prescription of Advair     1 refil to my pharmacy at [address] [place] [address] near [facility] in [place]. I am having difficulty with the pharmacies in [facility]).    thank you   Benjamin Asif

## 2017-11-03 ENCOUNTER — TELEPHONE (OUTPATIENT)
Dept: PULMONOLOGY | Age: 72
End: 2017-11-03

## 2017-11-03 NOTE — TELEPHONE ENCOUNTER
Pulmonary Rehab called patient and left a message about the program. Will follow up if I don't hear back.     Thank you,  Asim Quinteros

## 2017-11-08 NOTE — TELEPHONE ENCOUNTER
From     Kitty Gallardo      To     [facility] Nurses      Sent     11/7/2017  7:35 PM             Dear Dr. Kyle Crandall will be running out of spirva shortly and I am in the donut hole. I need a less expensive alternative. ..respirvat?    I need one prescription to be sent to my pharmacy at [address] [place] [address]) and when sent to my house to be sent to [address] [place] [address] you! Kitty Gallardo

## 2017-11-08 NOTE — TELEPHONE ENCOUNTER
L/M through email pt advise for pt to contact our office re: samples. There is not another inhaler that is inexpensive that is like Spiriva.

## 2017-12-12 NOTE — TELEPHONE ENCOUNTER
From     Bandar Savage      To     [facility] Nurses      Sent     12/12/2017  8:29 AM            Dear Dr. Ruth Joseph write a 3 month supply of spiriva. I am able to obtain it from a [address] [place] [address] J for $75.00. I would appreciate your mailing me the prescription. Thank you! Happy Holidays.    Bandar Savage

## 2018-01-01 NOTE — TELEPHONE ENCOUNTER
Sent pt a message thru the patient portal email that Rx for Advair sent to [facility]

## 2018-01-12 ENCOUNTER — OFFICE VISIT (OUTPATIENT)
Dept: PULMONOLOGY | Age: 73
End: 2018-01-12

## 2018-01-12 VITALS
HEIGHT: 69 IN | DIASTOLIC BLOOD PRESSURE: 68 MMHG | WEIGHT: 200 LBS | RESPIRATION RATE: 16 BRPM | OXYGEN SATURATION: 94 % | BODY MASS INDEX: 29.62 KG/M2 | TEMPERATURE: 98.2 F | HEART RATE: 73 BPM | SYSTOLIC BLOOD PRESSURE: 112 MMHG

## 2018-01-12 DIAGNOSIS — J41.0 BRONCHITIS, CHRONIC, SIMPLE (HCC): ICD-10-CM

## 2018-01-12 DIAGNOSIS — K44.9 HIATUS HERNIA SYNDROME: ICD-10-CM

## 2018-01-12 DIAGNOSIS — J44.9 ASTHMA WITH COPD (HCC): Primary | ICD-10-CM

## 2018-01-12 DIAGNOSIS — J45.30 MILD PERSISTENT ASTHMA WITHOUT COMPLICATION: ICD-10-CM

## 2018-01-12 DIAGNOSIS — R53.82 CHRONIC FATIGUE: ICD-10-CM

## 2018-01-12 DIAGNOSIS — Z85.46 PERSONAL HISTORY OF PROSTATE CANCER: ICD-10-CM

## 2018-01-12 RX ORDER — FLUOXETINE HYDROCHLORIDE 20 MG/1
CAPSULE ORAL DAILY
COMMUNITY
End: 2018-06-19

## 2018-01-12 NOTE — PROGRESS NOTES
Wants another nebulizer. Feels like the one he has is not working. Nebulizer is only 5 months old. Admits to using Advair 2 puffs in AM.  Advised to use albuterol inhaler 30 min prior, but to restrict his Advair to only 1 puff, bid.

## 2018-01-12 NOTE — PROGRESS NOTES
[facility]  Pulmonary, Critical Care, and Sleep Medicine      Pulmonary Office visit    Name: Apryl Arreaga     : 1945     Date: 2018        Subjective:   Patient has been referred for evaluation of: SOB    18   Feels significantly improved with combination of Spiriva and Advair. Intermittently has increased symptoms. Overall breathing more comfortable,  Still not able to get in deep breath. Has non productive deep dry cough. Denies any new interval problems  Feels depressed and seeing a psychiatrist.  Emily Frederick Ca under control. 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 He was initially found to have cardiomyopathy due to viral illness in . He had an EF of 15% initially. He had normal coronaries at that time. In years past he's had EF's as high as 50% but his most recent echo earlier this year showed an ejection fraction down to 35%. He's had progressive decrease in blood pressures and has had his meds reduced. He's no longer taking hydralazine and his Coreg dose has been decreasd from 50 to 25 mg bid. His main complaints are continued fatigue and shortness of breath. He had asthma as a child and hence the diagnosis of COPD  He denies cigarette smoking. He had been on Spiriva and Advair but had to stop Spiriva about 1 year back due to cost of medication. Never hospitalized for COPD/asthma exacerbations but has had several ER visits for bronchitis, Pneumonia. Admits to reflux. Denies significant cough, dry or productive. Denies nasal congestion, postnasal drainage. C/o severe leg weakness- bilateral.  C/o occasional edema LE. He has had no chest pain. He does have a history of hypertension. He has had a recent increase in hemoglobin A1C but no history of overt diabetes. He has elevated cholesterol on medications. There is family history of cardiac disease.  He denies stroke, rheumatic fever, or claudication. He is currently undergoing radiation therapy for prostrate ca. Had prostrate surgery followed by orchiectomy in [id number]. No chemotherapy. PAST SURGICAL HISTORY: Pertinent for a prostatectomy in 2015, orchiectomy in 2016, hiatal hernia repair in 2013 which failed, and total knee replacement on the right. MEDICAL HISTORY: Pertinent for the cardiomyopathy, hypertension, COPD with asthma, Mason's esophagus, pectus excavatum with pectoral implants. SOB:   Symptoms occur on exertion. Able to walk about 200  Feet. Cannot climb stairs.  Activities of daily living are affected  Denies orthopnea/ paroxysmal nocturnal dyspnea  SOB relieved with rest, inhalers/ nebulizers    Treatment so far- Advair, duoneb  Imaging studies- CXR  Other testing- PFT, 6 min walk  Comorbid conditions include- HTN, Cardiomyopathy, Neuropathy, Mason's esophagus, Hiatal hernia, Pectus deformity  Never Smoker  Occupational exposure-none    Past Medical History:   Diagnosis Date    Abdominal wall hernia     Anxiety     Asthma     Mason's esophagus with esophagitis     Cardiomyopathy     Chronic fatigue     Chronic lung disease     CKD (chronic kidney disease), stage III     COPD (chronic obstructive pulmonary disease) ([address]     Coronary artery disease     Decreased hearing     Deficiency anemia     Depression     DJD (degenerative joint disease)     Esophageal reflux     Hiatal hernia     Hiatus hernia syndrome     History of blood transfusion     HLD (hyperlipidemia)     HTN (hypertension)     Major depressive disorder     Mixed incontinence     Personal history of prostate cancer     Prostate cancer ([address]     Prostate cancer metastatic to bone ([address]     Renal disease     Renal failure     Urinary incontinence     Urinary tract infection, site not specified     UTI (urinary tract infection)     Vitamin D deficiency     Wears dentures        Past Surgical History: Procedure Laterality Date    BIOPSY PROSTATE      [id number] gl4+3.  HX HERNIA REPAIR      HX KNEE REPLACEMENT      HX ORCHIECTOMY  08/11/2016    HX OTHER SURGICAL      IMPLANTS    HX OTHER SURGICAL      TOTAL KNEE/ TOTAL HIP REPLACEMENT PRE OP ORDERSET    HX OTHER SURGICAL  08/11/2016    HERNIA REPAIR LAPAROSCOPIC    HX OTHER SURGICAL  08/11/2016    TESTICULAR PROSTHESIS INSERTION    HX RADICAL PROSTATECTOMY  12.2015       Allergies   Allergen Reactions    Egg Unable to Obtain    Ketorolac Tromethamine Other (comments)     Kidney insufficient    Nsaids (Non-Steroidal Anti-Inflammatory Drug) Other (comments)    Prilosec [Omeprazole] Other (comments)     Flank and abd pain    Proton Pump Inhibitors Other (comments)     Current Outpatient Prescriptions   Medication Sig Dispense Refill    FLUoxetine (PROZAC) 20 mg capsule Take  by mouth daily.  tiotropium bromide (SPIRIVA RESPIMAT) 2.5 mcg/actuation inhaler Take 2 Puffs by inhalation daily. 3 Inhaler 3    zolpidem (AMBIEN) 10 mg tablet       fluticasone-salmeterol (ADVAIR) 500-50 mcg/dose diskus inhaler Take 1 Puff by inhalation two (2) times a day. 1 Each 3    cholecalciferol (VITAMIN D3) 1,000 unit tablet 5,000 Units.  aspirin delayed-release 81 mg tablet 81 mg.      furosemide (LASIX) 40 mg tablet 20 mg every other day.  pramipexole (MIRAPEX) 0.25 mg tablet 0.25 mg.  pravastatin (PRAVACHOL) 20 mg tablet TAKE 1 TABLET EVERY EVENING      traZODone (DESYREL) 300 mg tablet 400 mg nightly.  QUEtiapine (SEROQUEL) 25 mg tablet TK 3 TS PO QHS  0    denosumab (PROLIA) 60 mg/mL injection 1 mL by SubCUTAneous route every 6 months. 60 mL 0    ENTRESTO 24-26 mg tablet TK 1 T PO BID  6    PROAIR HFA 90 mcg/actuation inhaler       ondansetron (ZOFRAN ODT) 4 mg disintegrating tablet 8 mg.  raNITIdine (ZANTAC) 150 mg tablet TAKE 1 TAB BY MOUTH TWICE DAILY.   3    carvedilol (COREG) 25 mg tablet Take 25 mg by mouth two (2) times daily (with meals).  Reduced to 12.5 bid      lamoTRIgine (LAMICTAL) 25 mg tablet       gabapentin (NEURONTIN) 300 mg capsule 300 mg.      metFORMIN ER (GLUCOPHAGE XR) 500 mg tablet   4         Review of Systems:  HEENT: No epistaxis, no nasal drainage, no difficulty in swallowing, no redness in eyes  Respiratory: as above  Cardiovascular: no chest pain, no palpitations, no chronic leg edema, no syncope  Gastrointestinal: no abd pain, no vomiting, no diarrhea, no bleeding symptoms  Genitourinary: No urinary symptoms or hematuria  Integument/breast: No ulcers or rashes  Musculoskeletal:Neg  Neurological: No focal weakness, no seizures, no headaches  Behvioral/Psych: No anxiety, no depression  Constitutional: No fever, no chills, ++ weight loss, no night sweats     Objective:     Visit Vitals    /68 (BP 1 Location: Left arm, BP Patient Position: Sitting)    Pulse 73    Temp 98.2 °F (36.8 °C) (Oral)    Resp 16    Ht 5' 9\" (1.753 m)    Wt 90.7 kg (200 lb)    SpO2 94%    BMI 29.53 kg/m2        Physical Exam:   General: comfortable, no acute distress  HEENT: pupils reactive, sclera anicteric, EOM intact  Neck: No adenopathy or thyroid swelling, no lymphadenopathy or JVD, supple  CVS: S1S2 no murmurs  RS: Mod AE bilaterally, no tactile fremitus or egophony, no accessory muscle use  Abd: soft, non tender, no hepatosplenomegaly  Neuro: non focal, awake, alert  Extrm: no leg edema, clubbing or cyanosis  Skin: no rash    Data review:   Pertinent labs: CBC, BMP, LFT's  PSA- 9.7    PFT:    Date FVC% FEV1  FEV1/FVC [id number] TLC RV RV/TLC VC DLCO   7/14/2017 48 32 67 17                                              PFT:9/28/16  FVC 2.12 (51 %)  FEV1 1.29 (43 %)->+24% post BD  FEV1/FVC 61 %  TLC 80 %   %  DL/VA 63 %    SIX MINUTE WALK: 8/17/16    Interpretation: Ambulatory oximetry Does not show significant desaturation;starting O2 sat 98 %; low O2 sat 97 %;   starting HR 70 bpm; high HR 79 bpm  Dyspnea scale: beginning 3; ending 5  Total distance : 770 ft   Total time: 6 minutes      Echo: 7/10/2017:  Normal LV function normal, RV dilated. NORMAL LEFT VENTRICULAR CAVITY SIZE WITH MODERATE CONCENTRIC LEFT VENTRICULAR   HYPERTROPHY. REDUCED LEFT VENTRICULAR EJECTION FRACTION BUT EXACT EF  CANNOT BE DETERMINED WITH THIS   STUDY   MILD (STAGE I) DIASTOLIC DYSFUNCTION. MODERATELY DILATED LEFT ATRIUM. DILATED RIGHT VENTRICULAR SIZE WITH NORMAL SYSTOLIC FUNCTION. NO HEMODYNAMICALLY SIGNIFICANT VALVULAR PATHOLOGY. ESTIMATED PULMONARY ARTERY PRESSURE IS 32 MMHG. AORTIC ROOT DILATATION. NO OBVIOUS MASSES, SHUNTS OR THROMBI SEEN. Imaging:  I have personally reviewed the patients radiographs and have reviewed the reports:  V/Q scan 8/3/2017: Moderate central deposition of radiotracer on ventilatory phase images, suggestive of nonlaminar/turbulent flow, most often seen with COPD. There is no evidence of moderate or large mismatched segmental perfusion defect to indicate the presence of pulmonary embolism. IMPRESSION:     Low probability for pulmonary embolism. CXR:2/1/17  Cardiac enlargement with no acute infiltrate. There is a moderate size hiatal hernia. Calcified hilar and mediastinal lymph nodes indicating old granulomatous disease. No acute infiltrate or edema is identified    CXR: 10/18/16 CXR independently reviewed and results discussed with pt. Improved aeration with decreasing airspace disease right lower lobe. Mild parenchymal scarring. Stable cardiomegaly. Hiatal hernia. CXR: 10/13/16:  New airspace consolidation in the lateral right midlung on the PA view suspicious for pneumonia. Stable cardiomegaly. Stable linear parenchymal scarring in the left midlung. Persistent hiatal hernia.          Patient Active Problem List   Diagnosis Code    Essential hypertension I10    COPD (chronic obstructive pulmonary disease) ([facility] J44.9    Mason's esophagus K22.70    Multiple-type hyperlipidemia E78.4    Major depressive disorder, recurrent severe without psychotic features ([facility] F33.2    Cardiomyopathy ([facility] I42.9    Prostate cancer, 431055  dvp Vani [id number] gl4+3(tertiary 5) psa gray 2.2 with bone mets C61    Bone Metastases from [place], C79.51    Mixed incontinence N39.46    Urinary tract infection N39.0    Chronic fatigue R53.82    Wears dentures Z97.2, K08.109    Urinary tract infection, site not specified N39.0    Urinary incontinence R32    Renal failure N19    Renal disease N28.9    Personal history of prostate cancer Z85.46    History of blood transfusion Z92.89    Hiatus hernia syndrome K44.9    Hiatal hernia K44.9    Esophageal reflux K21.9    DJD (degenerative joint disease) M19.90    Depression F32.9    Deficiency anemia D53.9    Decreased hearing H91.90    Coronary artery disease I25.10    Asthma J45.909    Anxiety F41.9    Abdominal wall hernia K43.9    Chronic low back pain M54.5, G89.29    History of surgical procedure Z98.890    Incisional hernia K43.2    Atelectasis J98.11    Pneumonia due to infectious organism J18.9    Bronchitis, chronic, simple ([facility]) J41.0    Vitamin D deficiency E55.9    Major depressive disorder F32.9    HTN (hypertension) I10    HLD (hyperlipidemia) E78.5    Mason's esophagus with esophagitis K22.70, K20.9    CHF (congestive heart failure) ([facility]) I50.9    Chronic kidney disease, stage III (moderate) N18.3    Hx of bacterial pneumonia Z87.01    Hx of fracture of rib Z87.81    Low testosterone E34.9    Osteopenia M85.80    Prediabetes R73.03    Restless legs syndrome G25.81     IMPRESSION:   · SOB: multifactorial. Predominant ASTHMA-COPD Significant Pulmonary functional impairment from  Obstructive airway component: advance asthma- COPD airway remodeling over the years with additional restriction from neuromuscular/skeletal contributors: pectus deformity wit reconstruction and hormonal metaboliceffects ? Additional role from hiatal hernia and GERD. VTE ruled out with V/Q scan-low probability. Additional cardiovascular factors- cardiomyopathy, systolic and daistolic failure  · Cardiomyopathy  · Hiatal hernia  · Anxiety-depression  · Prostrate ca      RECOMMENDATIONS:   · Will continue to optimize treatment for obstructive airways defect component- Spiriva daily and to continue LABA+ICS,  · Written Prescriptions given at patient request.  · Early intervention for exacerbations, control triggers- GERD? hiatal hernia factors  · Will benefit from Pulmonary rehab- referral made  · Will check PFT's and 6 min walk as clinically indicated  · Aspiration precautions and GERD treatment- will consider GI evaluation for endoscopic treatment options  · Discussed in details plan of care and answered questions to the best of my ability. · Will follow up in 3 months     Health maintenance screens deferred to Primary care provider.      Dmitriy Alarcon MD

## 2018-01-12 NOTE — MR AVS SNAPSHOT
Visit Information Date & Time Provider Department Dept. Phone Encounter #  
 1/12/2018  1:00 PM Venus Tinajero MD [facility] [place] [phone] Follow-up Instructions Return in about 4 months (around 5/12/2018). Your Appointments 2/14/2018 11:10 AM  
ESTABLISHED PATIENT with Bob Guerrero MD  
[facility] ([facility]) Appt Note: Return in about 3 months (around 11/9/2017) for Follow up in 3 months to evaluate symptoms and again in 6 months for Prolia. Darnella Hopper [address], [place] [address]  
  
   
 [address] [facility], [address] [address] Upcoming Health Maintenance Date Due Hepatitis C Screening 1945 DTaP/Tdap/Td series (1 - Tdap) 8/28/1966 FOBT Q 1 YEAR AGE 50-75 8/28/1995 ZOSTER VACCINE AGE 60> 6/28/2005 GLAUCOMA SCREENING Q2Y 8/28/2010 MEDICARE YEARLY EXAM 8/28/2010 Influenza Age 5 to Adult 8/1/2017 Pneumococcal 65+ High/Highest Risk (2 of 2 - PCV13) 8/25/2017 Allergies as of 1/12/2018  Review Complete On: 1/12/2018 By: Venus Tinajero MD  
  
 Severity Noted Reaction Type Reactions Egg  08/02/2014    Unable to Obtain Ketorolac Tromethamine  09/06/2009    Other (comments) Kidney insufficient Nsaids (Non-steroidal Anti-inflammatory Drug)  09/06/2009    Other (comments) Prilosec [Omeprazole]  09/08/2016    Other (comments) Flank and abd pain Proton Pump Inhibitors  09/06/2016    Other (comments) Current Immunizations  Never Reviewed Name Date Influenza Vaccine Whole 1/3/2011 Pneumococcal Polysaccharide (PPSV-23) 8/25/2016 Not reviewed this visit You Were Diagnosed With   
  
 Codes Comments Asthma with COPD ([facility]    -  Primary ICD-10-CM: J44.9 ICD-9-CM: 493.20 Vitals BP Pulse Temp Resp Height(growth percentile) Weight(growth percentile) 112/68 (BP 1 Location: Left arm, BP Patient Position: Sitting) 73 98.2 °F (36.8 °C) (Oral) 16 5' 9\" (1.753 m) 200 lb (90.7 kg) SpO2 BMI Smoking Status 94% 29.53 kg/m2 Never Smoker Vitals History BMI and BSA Data Body Mass Index Body Surface Area  
 29.53 kg/m 2 2.1 m 2 Preferred Pharmacy Pharmacy Name Phone Med  [address] [facility], [facility] Trev Abraham [phone] Your Updated Medication List  
  
   
This list is accurate as of: 1/12/18  1:42 PM.  Always use your most recent med list.  
  
  
  
  
 aspirin delayed-release 81 mg tablet 81 mg.  
  
 cholecalciferol 1,000 unit tablet Commonly known as:  VITAMIN D3  
5,000 Units. COREG 25 mg tablet Generic drug:  carvedilol Take 25 mg by mouth two (2) times daily (with meals). Reduced to 12.5 bid  
  
 denosumab 60 mg/mL injection Commonly known as:  Manuel Saver 1 mL by SubCUTAneous route every 6 months. ENTRESTO 24-26 mg tablet Generic drug:  sacubitril-valsartan TK 1 T PO BID  
  
 fluticasone-salmeterol 500-50 mcg/dose diskus inhaler Commonly known as:  ADVAIR Take 1 Puff by inhalation two (2) times a day. furosemide 40 mg tablet Commonly known as:  LASIX  
20 mg every other day.  
  
 gabapentin 300 mg capsule Commonly known as:  NEURONTIN  
300 mg.  
  
 lamoTRIgine 25 mg tablet Commonly known as: LaMICtal  
  
 metFORMIN  mg tablet Commonly known as:  GLUCOPHAGE XR  
  
 ondansetron 4 mg disintegrating tablet Commonly known as:  ZOFRAN ODT  
8 mg. pramipexole 0.25 mg tablet Commonly known as:  MIRAPEX  
0.25 mg.  
  
 pravastatin 20 mg tablet Commonly known as:  PRAVACHOL  
TAKE 1 TABLET EVERY EVENING  
  
 PROAIR HFA 90 mcg/actuation inhaler Generic drug:  albuterol PROzac 20 mg capsule Generic drug:  FLUoxetine Take  by mouth daily. QUEtiapine 25 mg tablet Commonly known as:  SEROquel TK 3 TS PO QHS  
  
 raNITIdine 150 mg tablet Commonly known as:  ZANTAC TAKE 1 TAB BY MOUTH TWICE DAILY. * tiotropium bromide 2.5 mcg/actuation inhaler Commonly known as:  Solomon Loosen Take 2 Puffs by inhalation daily. * tiotropium 18 mcg inhalation capsule Commonly known as:  [address] Take 1 Cap by inhalation daily. Prescribe Spiriva rotahaler * tiotropium bromide 2.5 mcg/actuation inhaler Commonly known as:  Solomon Loosen Take 2 Puffs by inhalation daily. traZODone 300 mg tablet Commonly known as:  DESYREL  
400 mg nightly. zolpidem 10 mg tablet Commonly known as:  AMBIEN  
  
 * Notice: This list has 3 medication(s) that are the same as other medications prescribed for you. Read the directions carefully, and ask your doctor or other care provider to review them with you. Prescriptions Printed Refills  
 tiotropium (SPIRIVA WITH HANDIHALER) 18 mcg inhalation capsule 3 Sig: Take 1 Cap by inhalation daily. Prescribe Spiriva rotahaler Class: Print Route: Inhalation We Performed the Following AMB SUPPLY ORDER [[id number] Custom] Comments:  
 Nebulizer with supplies. Current machine broken Follow-up Instructions Return in about 4 months (around 5/12/2018). Introducing [facility]! Dear Karina Burden: Thank you for requesting a [employer] account. Our records indicate that you already have an active [employer] account. You can access your account anytime at [email or web address] Did you know that you can access your hospital and ER discharge instructions at any time in [employer]? You can also review all of your test results from your hospital stay or ER visit. Additional Information If you have questions, please visit the Frequently Asked Questions section of the [employer] website at [email or web address] Remember, [employer] is NOT to be used for urgent needs. For medical emergencies, dial 911. Now available from your iPhone and Android! Please provide this summary of care documentation to your next provider. Your primary care clinician is listed as Sheila Ga. If you have any questions after today's visit, please call [phone].

## 2018-01-29 ENCOUNTER — TELEPHONE (OUTPATIENT)
Dept: PULMONOLOGY | Age: 73
End: 2018-01-29

## 2018-01-29 NOTE — TELEPHONE ENCOUNTER
Pulmonary Rehab called patient and he may be interested. He wants to think about it and call back in a couple of days. Will follow up if I don't hear back from patient.      Thank you,  Tamiko Caballero

## 2018-02-23 DIAGNOSIS — J44.9 CHRONIC OBSTRUCTIVE PULMONARY DISEASE, UNSPECIFIED COPD TYPE (HCC): Primary | ICD-10-CM

## 2018-02-23 RX ORDER — FLUTICASONE PROPIONATE AND SALMETEROL 500; 50 UG/1; UG/1
1 POWDER RESPIRATORY (INHALATION) 2 TIMES DAILY
Qty: 3 EACH | Refills: 3 | Status: SHIPPED | OUTPATIENT
Start: 2018-02-23 | End: 2018-06-19

## 2018-02-23 RX ORDER — FLUTICASONE PROPIONATE AND SALMETEROL 500; 50 UG/1; UG/1
1 POWDER RESPIRATORY (INHALATION) 2 TIMES DAILY
Qty: 3 EACH | Refills: 1 | Status: SHIPPED | OUTPATIENT
Start: 2018-02-23 | End: 2018-02-23 | Stop reason: SDUPTHER

## 2018-02-23 RX ORDER — FLUTICASONE PROPIONATE AND SALMETEROL 500; 50 UG/1; UG/1
1 POWDER RESPIRATORY (INHALATION) 2 TIMES DAILY
Qty: 3 EACH | Refills: 3 | Status: CANCELLED | OUTPATIENT
Start: 2018-02-23

## 2018-02-23 NOTE — TELEPHONE ENCOUNTER
From     Dalton Evans      To     [facility]      Sent     2/23/2018 11:45 AM            Could you write me a prescription advair   a 3 month supply   50/500   TY   Dalton Evans

## 2018-02-27 ENCOUNTER — TELEPHONE (OUTPATIENT)
Dept: PULMONOLOGY | Age: 73
End: 2018-02-27

## 2018-02-27 NOTE — TELEPHONE ENCOUNTER
We received a fax from [address] [place] [address] [facility] and [facility] stating that the pt's Advair Diskus needs authorization. In checking [employer] it appears all inhalers in the class require authorization and they are all Tier 3. [facility] prior Tyronloni Esparza. Form is filled out and faxed to them.

## 2018-02-28 ENCOUNTER — TELEPHONE (OUTPATIENT)
Dept: PULMONOLOGY | Age: 73
End: 2018-02-28

## 2018-02-28 NOTE — TELEPHONE ENCOUNTER
Another [facility] faxed form is filled out and faxed back to them re: obtaining authorization for Advair Diskus.

## 2018-04-23 ENCOUNTER — TELEPHONE (OUTPATIENT)
Dept: PULMONOLOGY | Age: 73
End: 2018-04-23

## 2018-04-23 NOTE — TELEPHONE ENCOUNTER
Pulmonary Rehab called patient and spoke to him about the program. He is still not ready and wants to call us when he is ready. I gave patient our contact information and will follow up if we don't hear back from him.      Thank you,  Frankie Streeter

## 2018-05-21 ENCOUNTER — TELEPHONE (OUTPATIENT)
Dept: PULMONOLOGY | Age: 73
End: 2018-05-21

## 2018-05-21 NOTE — TELEPHONE ENCOUNTER
From     Nabil Burton      To     [facility]      Sent     5/20/2018  8:59 PM            Dear , I would appreciate a sample of Spiriva. Thank you for your consideration.  Nabil Burton

## 2018-05-23 ENCOUNTER — TELEPHONE (OUTPATIENT)
Dept: PULMONOLOGY | Age: 73
End: 2018-05-23

## 2018-05-23 NOTE — TELEPHONE ENCOUNTER
Pulmonary Rehab called patient and spoke to him about the program. He is still not ready to begin and wants to continue to think about it. Patient stated he would call us when ready.     Thank you,  Fly Ortiz

## 2018-06-26 NOTE — TELEPHONE ENCOUNTER
From     Alejandra Hu      To     [facility]      Sent     6/26/2018 11:04 AM            I need a prescription for spiriva 3 months supply to send to. Manuela Duarte you so much. I am on so much medication that are expensive. My using [address] helps greatly. Asia Schaeffer you again.  Alejandra Hu

## 2018-06-29 ENCOUNTER — TELEPHONE (OUTPATIENT)
Dept: PULMONOLOGY | Age: 73
End: 2018-06-29

## 2018-06-29 NOTE — TELEPHONE ENCOUNTER
Pulmonary Rehab called patient and spoke to him about the program. He is still interested and should be ready to begin in about a month. Will follow up at that time.     Thank you,  Gus Engel

## 2018-07-05 ENCOUNTER — TELEPHONE (OUTPATIENT)
Dept: PULMONOLOGY | Age: 73
End: 2018-07-05

## 2018-07-05 NOTE — TELEPHONE ENCOUNTER
The pt. Is left a message that his prescription for  Spiriva has been done and if, he wants it mailed to him, please let us know. Otherwise it may be picked up at the .

## 2018-07-09 NOTE — TELEPHONE ENCOUNTER
Pt received call about whether he wanted Spiriva 3month supply prescription mailed or to be picked up. Pt would like for the prescription to be mailed. Address in system verified as correct.

## 2018-07-12 ENCOUNTER — TELEPHONE (OUTPATIENT)
Dept: PULMONOLOGY | Age: 73
End: 2018-07-12

## 2018-07-12 NOTE — TELEPHONE ENCOUNTER
PT CALLED([phone]). HE IS GOING TO  HIS SCRIPT AND WANTS TO KNOW IF HE CAN GET A SAMPLE OF Quinten Blankenship. PLEASE CHECK AND CALL BACK.

## 2018-07-16 ENCOUNTER — TELEPHONE (OUTPATIENT)
Dept: PULMONOLOGY | Age: 73
End: 2018-07-16

## 2018-07-26 ENCOUNTER — TELEPHONE (OUTPATIENT)
Dept: PULMONOLOGY | Age: 73
End: 2018-07-26

## 2018-07-26 NOTE — TELEPHONE ENCOUNTER
Pulmonary Rehab called patient and left a message about the program. Additional attempts at contact will be made.      Thank you,  Linda Corcoran

## 2018-08-28 ENCOUNTER — TELEPHONE (OUTPATIENT)
Dept: PULMONOLOGY | Age: 73
End: 2018-08-28

## 2018-09-25 ENCOUNTER — TELEPHONE (OUTPATIENT)
Dept: PULMONOLOGY | Age: 73
End: 2018-09-25

## 2018-10-30 ENCOUNTER — TELEPHONE (OUTPATIENT)
Dept: PULMONOLOGY | Age: 73
End: 2018-10-30

## 2018-10-30 NOTE — TELEPHONE ENCOUNTER
From  Mary Galeas To  [facility] Nurs[facility] Sent  10/30/2018 10:40 AM   ----- Message from [email or web address], Generic sent at 10/30/2018 10:40 AM EDT -----     Dear Dr. Jany Cleaning,   I am in the donut hole. Is it possible I may have  a  Spirva sample? Thank you so much!    Mary Galeas

## 2018-10-30 NOTE — TELEPHONE ENCOUNTER
Pt listed as taking Spiriva Handihaler which we do not get samples. Pt in [facility] and states in the past he was given sample of respimat to hold him over.

## 2018-10-30 NOTE — TELEPHONE ENCOUNTER
From  Ibeth Harper To  [facility] Nurs[facility] Sent  10/30/2018 10:42 AM   ----- Message from [address] [place] [address] [address], Generic sent at 10/30/2018 10:42 AM EDT -----     Dear Dr. Carroll Ferrari,     I would like to make a F/U appt. as soon as possible.    Thanks   Ibeth Harper

## 2018-11-08 ENCOUNTER — TELEPHONE (OUTPATIENT)
Dept: PULMONOLOGY | Age: 73
End: 2018-11-08

## 2018-11-08 NOTE — TELEPHONE ENCOUNTER
Pulmonary Rehab called patient and left a message about the program. Additional attempts at contact will be made.     Thank you,  Errol Sandoval

## 2018-12-12 ENCOUNTER — OFFICE VISIT (OUTPATIENT)
Dept: PULMONOLOGY | Age: 73
End: 2018-12-12

## 2018-12-12 VITALS
DIASTOLIC BLOOD PRESSURE: 64 MMHG | BODY MASS INDEX: 29.47 KG/M2 | TEMPERATURE: 97.6 F | HEIGHT: 69 IN | WEIGHT: 199 LBS | SYSTOLIC BLOOD PRESSURE: 112 MMHG | HEART RATE: 64 BPM | OXYGEN SATURATION: 95 % | RESPIRATION RATE: 18 BRPM

## 2018-12-12 DIAGNOSIS — J44.9 ASTHMA WITH COPD (HCC): Primary | ICD-10-CM

## 2018-12-12 DIAGNOSIS — I42.0 DILATED CARDIOMYOPATHY (HCC): ICD-10-CM

## 2018-12-12 DIAGNOSIS — K21.9 GASTROESOPHAGEAL REFLUX DISEASE WITHOUT ESOPHAGITIS: ICD-10-CM

## 2018-12-12 DIAGNOSIS — I10 ESSENTIAL HYPERTENSION: ICD-10-CM

## 2018-12-12 NOTE — PROGRESS NOTES
Chief Complaint   Patient presents with    COPD    Shortness of Breath     1. Have you been to the ER, urgent care clinic since your last visit? Hospitalized since your last visit? No    2. Have you seen or consulted any other health care providers outside of the [address] [place] [address] since your last visit? Include any pap smears or colon screening.  Yes Where: Dr Betty Walker PCP, Dr Boubacar Echols [facility]iology, [facility]

## 2018-12-12 NOTE — PROGRESS NOTES
[facility]  Pulmonary, Critical Care, and Sleep Medicine      Pulmonary Office visit    Name: Durene Councilman     : 1945     Date: 2018        Subjective:   Patient has been referred for evaluation of: SOB    18   Feels significantly improved with combination of LAMA and LABA. [facility] has increased symptoms. Overall breathing more comfortable,  Still not able to get in deep breath. Has non productive deep dry cough. Denies any new interval problems  Feels depressed and seeing a psychiatrist.  Phuc Mirandamelodie Ca under control. 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 He was initially found to have cardiomyopathy due to viral illness in . He had an EF of 15% initially. He had normal coronaries at that time. In years past he's had EF's as high as 50% but his most recent echo earlier this year showed an ejection fraction down to 35%. He's had progressive decrease in blood pressures and has had his meds reduced. He's no longer taking hydralazine and his Coreg dose has been decreasd from 50 to 25 mg bid. His main complaints are continued fatigue and shortness of breath. He had asthma as a child and hence the diagnosis of COPD  He denies cigarette smoking. He had been on Spiriva and Advair but had to stop Spiriva about 1 year back due to cost of medication. Never hospitalized for COPD/asthma exacerbations but has had several ER visits for bronchitis, Pneumonia. Admits to reflux. Denies significant cough, dry or productive. Denies nasal congestion, postnasal drainage. C/o severe leg weakness- bilateral.  C/o occasional edema LE. He has had no chest pain. He does have a history of hypertension. He has had a recent increase in hemoglobin A1C but no history of overt diabetes. He has elevated cholesterol on medications.  There is family history of cardiac disease. He denies stroke, rheumatic fever, or claudication. He is currently undergoing radiation therapy for prostrate ca. Had prostrate surgery followed by orchiectomy in [id number]. No chemotherapy. PAST SURGICAL HISTORY: Pertinent for a prostatectomy in 2015, orchiectomy in 2016, hiatal hernia repair in 2013 which failed, and total knee replacement on the right. MEDICAL HISTORY: Pertinent for the cardiomyopathy, hypertension, COPD with asthma, Mason's esophagus, pectus excavatum with pectoral implants. SOB:   Symptoms occur on exertion. Able to walk about 200  Feet. Cannot climb stairs.  Activities of daily living are affected  Denies orthopnea/ paroxysmal nocturnal dyspnea  SOB relieved with rest, inhalers/ nebulizers    Imaging studies- CXR  Other testing- PFT, 6 min walk  Comorbid conditions include- HTN, Cardiomyopathy, Neuropathy, Mason's esophagus, Hiatal hernia, Pectus deformity  Never Smoker  Occupational exposure-none    Past Medical History:   Diagnosis Date    Abdominal wall hernia     Anxiety     Asthma     Mason's esophagus with esophagitis     Cardiomyopathy     Chronic fatigue     Chronic lung disease     CKD (chronic kidney disease), stage III (HCC)     COPD (chronic obstructive pulmonary disease) ([address]     Coronary artery disease     Decreased hearing     Deficiency anemia     Depression     DJD (degenerative joint disease)     Esophageal reflux     Hiatal hernia     Hiatus hernia syndrome     History of blood transfusion     HLD (hyperlipidemia)     HTN (hypertension)     Major depressive disorder     Mixed incontinence     Personal history of prostate cancer     Prostate cancer ([address]     Prostate cancer metastatic to bone ([address]     Renal disease     Renal failure     Urinary incontinence     Urinary tract infection, site not specified     UTI (urinary tract infection)     Vitamin D deficiency     Wears dentures        Past Surgical History: Procedure Laterality Date    BIOPSY PROSTATE      [id number] gl4+3.  HX HERNIA REPAIR      HX KNEE REPLACEMENT      HX ORCHIECTOMY  08/11/2016    HX OTHER SURGICAL      IMPLANTS    HX OTHER SURGICAL      TOTAL KNEE/ TOTAL HIP REPLACEMENT PRE OP ORDERSET    HX OTHER SURGICAL  08/11/2016    HERNIA REPAIR LAPAROSCOPIC    HX OTHER SURGICAL  08/11/2016    TESTICULAR PROSTHESIS INSERTION    HX RADICAL PROSTATECTOMY  12.2015       Allergies   Allergen Reactions    Egg Unable to Obtain    Ketorolac Tromethamine Other (comments)     Kidney insufficient    Nsaids (Non-Steroidal Anti-Inflammatory Drug) Other (comments)    Prilosec [Omeprazole] Other (comments)     Flank and abd pain    Proton Pump Inhibitors Other (comments)     Current Outpatient Medications   Medication Sig Dispense Refill    carvedilol (COREG) 12.5 mg tablet TK 2 TS PO BID  3    hydrALAZINE (APRESOLINE) 10 mg tablet TK 1 T PO TID  1    zolpidem (AMBIEN) 10 mg tablet       cholecalciferol (VITAMIN D3) 1,000 unit tablet 5,000 Units.  aspirin delayed-release 81 mg tablet 81 mg.      furosemide (LASIX) 40 mg tablet 20 mg every other day.  pramipexole (MIRAPEX) 0.25 mg tablet 0.25 mg.  pravastatin (PRAVACHOL) 20 mg tablet TAKE 1 TABLET EVERY EVENING      traZODone (DESYREL) 300 mg tablet 400 mg nightly.  QUEtiapine (SEROQUEL) 25 mg tablet TK 3 TS PO QHS  0    PROAIR HFA 90 mcg/actuation inhaler       ondansetron (ZOFRAN ODT) 4 mg disintegrating tablet 8 mg.  raNITIdine (ZANTAC) 150 mg tablet TAKE 1 TAB BY MOUTH TWICE DAILY. 3    tiotropium bromide (SPIRIVA RESPIMAT) 2.5 mcg/actuation inhaler Take 2 Puffs by inhalation daily. 2 Inhaler 0    tiotropium (SPIRIVA WITH HANDIHALER) 18 mcg inhalation capsule Take 1 Cap by inhalation daily. 10 Cap 0    tiotropium (SPIRIVA WITH HANDIHALER) 18 mcg inhalation capsule Take 1 Cap by inhalation daily.  90 Cap 3    BREO ELLIPTA 100-25 mcg/dose inhaler TAKE 1 PUFF PO D. RINSE MOUTH WITH WATER AFTER EACH  3    mirabegron ER (MYRBETRIQ) 25 mg ER tablet Take 1 Tab by mouth daily. 30 Tab 6    denosumab (PROLIA) 60 mg/mL injection 1 mL by SubCUTAneous route every 6 months.  60 mL 0         Review of Systems:  HEENT: No epistaxis, no nasal drainage, no difficulty in swallowing, no redness in eyes  Respiratory: as above  Cardiovascular: no chest pain, no palpitations, no chronic leg edema, no syncope  Gastrointestinal: no abd pain, no vomiting, no diarrhea, no bleeding symptoms  Genitourinary: No urinary symptoms or hematuria  Integument/breast: No ulcers or rashes  Musculoskeletal:Neg  Neurological: No focal weakness, no seizures, no headaches  Behvioral/Psych: No anxiety, no depression  Constitutional: No fever, no chills, ++ weight loss, no night sweats     Objective:     Visit Vitals  /64 (BP 1 Location: Left arm, BP Patient Position: Sitting)   Pulse 64   Temp 97.6 °F (36.4 °C)   Resp 18   Ht 5' 8.5\" (1.74 m)   Wt 90.3 kg (199 lb)   SpO2 95%   BMI 29.82 kg/m²        Physical Exam:   General: comfortable, no acute distress  HEENT: pupils reactive, sclera anicteric, EOM intact  Neck: No adenopathy or thyroid swelling, no lymphadenopathy or JVD, supple  CVS: S1S2 no murmurs  RS: Mod AE bilaterally, no tactile fremitus or egophony, no accessory muscle use  Abd: soft, non tender, no hepatosplenomegaly  Neuro: non focal, awake, alert  Extrm: no leg edema, clubbing or cyanosis  Skin: no rash    Data review:   Pertinent labs: CBC, BMP, LFT's  PSA- 9.7    PFT:    Date FVC% FEV1  FEV1/FVC [id number] TLC RV RV/TLC VC DLCO   7/14/2017 48 32 67 17                                              PFT:9/28/16  FVC 2.12 (51 %)  FEV1 1.29 (43 %)->+24% post BD  FEV1/FVC 61 %  TLC 80 %   %  DL/VA 63 %    SIX MINUTE WALK: 8/17/16    Interpretation: Ambulatory oximetry Does not show significant desaturation;starting O2 sat 98 %; low O2 sat 97 %;   starting HR 70 bpm; high HR 79 bpm  Dyspnea scale: beginning 3; ending 5  Total distance : 770 ft   Total time: 6 minutes      Echo: 7/10/2017:  Normal LV function normal, RV dilated. NORMAL LEFT VENTRICULAR CAVITY SIZE WITH MODERATE CONCENTRIC LEFT VENTRICULAR   HYPERTROPHY. REDUCED LEFT VENTRICULAR EJECTION FRACTION BUT EXACT EF  CANNOT BE DETERMINED WITH THIS   STUDY   MILD (STAGE I) DIASTOLIC DYSFUNCTION. MODERATELY DILATED LEFT ATRIUM. DILATED RIGHT VENTRICULAR SIZE WITH NORMAL SYSTOLIC FUNCTION. NO HEMODYNAMICALLY SIGNIFICANT VALVULAR PATHOLOGY. ESTIMATED PULMONARY ARTERY PRESSURE IS 32 MMHG. AORTIC ROOT DILATATION. NO OBVIOUS MASSES, SHUNTS OR THROMBI SEEN. Imaging:  I have personally reviewed the patients radiographs and have reviewed the reports:  V/Q scan 8/3/2017: Moderate central deposition of radiotracer on ventilatory phase images, suggestive of nonlaminar/turbulent flow, most often seen with COPD. There is no evidence of moderate or large mismatched segmental perfusion defect to indicate the presence of pulmonary embolism. IMPRESSION:     Low probability for pulmonary embolism. CXR:2/1/17  Cardiac enlargement with no acute infiltrate. There is a moderate size hiatal hernia. Calcified hilar and mediastinal lymph nodes indicating old granulomatous disease. No acute infiltrate or edema is identified    CXR: 10/18/16 CXR independently reviewed and results discussed with pt. Improved aeration with decreasing airspace disease right lower lobe. Mild parenchymal scarring. Stable cardiomegaly. Hiatal hernia. CXR: 10/13/16:  New airspace consolidation in the lateral right midlung on the PA view suspicious for pneumonia. Stable cardiomegaly. Stable linear parenchymal scarring in the left midlung. Persistent hiatal hernia.          Patient Active Problem List   Diagnosis Code    Essential hypertension I10    COPD (chronic obstructive pulmonary disease) ([address] J44.9    Mason's esophagus K22.70    Multiple-type hyperlipidemia E78.2    Major depressive disorder, recurrent severe without psychotic features ([address] F33.2    Cardiomyopathy ([facility] I42.9    Prostate cancer, [id number]  dvp [place] [id number] gl4+3(tertiary 5) psa gray 2.2 with bone mets C61    Bone Metastases from [place], C79.51    Mixed incontinence N39.46    Urinary tract infection N39.0    Chronic fatigue R53.82    Wears dentures Z97.2    Urinary tract infection, site not specified N39.0    Urinary incontinence R32    Renal failure N19    Renal disease N28.9    Personal history of prostate cancer Z85.46    History of blood transfusion Z92.89    Hiatus hernia syndrome K44.9    Hiatal hernia K44.9    Esophageal reflux K21.9    DJD (degenerative joint disease) M19.90    Depression F32.9    Deficiency anemia D53.9    Decreased hearing H91.90    Coronary artery disease I25.10    Asthma J45.909    Anxiety F41.9    Abdominal wall hernia K43.9    Chronic low back pain M54.5, G89.29    History of surgical procedure Z98.890    Incisional hernia K43.2    Atelectasis J98.11    Pneumonia due to infectious organism J18.9    Bronchitis, chronic, simple ([facility]) J41.0    Vitamin D deficiency E55.9    Major depressive disorder F32.9    HTN (hypertension) I10    HLD (hyperlipidemia) E78.5    Mason's esophagus with esophagitis K22.70, K20.9    CHF (congestive heart failure) ([facility]) I50.9    Chronic kidney disease, stage III (moderate) ([facility]) N18.3    Hx of bacterial pneumonia Z87.01    Hx of fracture of rib Z87.81    Low testosterone R79.89    Osteopenia M85.80    Prediabetes R73.03    Restless legs syndrome G25.81     IMPRESSION:   · SOB: multifactorial. Predominant ASTHMA-COPD Significant Pulmonary functional impairment from  Obstructive airway component: advance asthma- COPD airway remodeling over the years with additional restriction from neuromuscular/skeletal contributors: pectus deformity wit reconstruction and hormonal metaboliceffects ? Additional role from hiatal hernia and GERD. VTE ruled out with V/Q scan-low probability. Additional cardiovascular factors- cardiomyopathy, systolic and daistolic failure  · Cardiomyopathy  · Hiatal hernia  · Anxiety-depression  · Prostrate ca      RECOMMENDATIONS:   · Will continue to optimize treatment for obstructive airways defect component-Trelegy samples provided to ensure he gets past donut hole. · Written Prescriptions will be given at patient request.  · Early intervention for exacerbations, control triggers- GERD/hiatal hernia factors  · Will benefit from Pulmonary rehab- will resume in early 2019   · Will check PFT's and 6 min walk as clinically indicated  · Aspiration precautions and GERD treatment- will consider GI evaluation for endoscopic treatment options  · Discussed in details plan of care and answered questions to the best of my ability. · Will follow up in 6 months     Health maintenance screens deferred to Primary care provider.      Daniel Gee MD

## 2018-12-28 ENCOUNTER — TELEPHONE (OUTPATIENT)
Dept: PULMONOLOGY | Age: 73
End: 2018-12-28

## 2018-12-28 NOTE — TELEPHONE ENCOUNTER
Pulmonary Rehab called patient and spoke to him about the program. He is not sure and wants to call us back if he is interested. We have been trying to get patient started in the program since 9/27/17 with no success, so no additional attempts at contact will be made. If patient is interested, please have him follow up with our office at [phone].     Thank you,  Conner Up

## 2019-01-03 ENCOUNTER — TELEPHONE (OUTPATIENT)
Dept: PULMONOLOGY | Age: 74
End: 2019-01-03

## 2019-01-03 NOTE — TELEPHONE ENCOUNTER
Pt dx with bronchitis at [facility]. Was tx with Clindamycin TID and Prednisone 60mg daily x 5 days. Pt still with 2 days left of tx, today and tomorrow. Pt questioning if he should be tx longer on these meds?  Pt is already feeling some better after 3 days of tx

## 2019-01-03 NOTE — TELEPHONE ENCOUNTER
From  Bipin Meza To  [facility] Nurses Sent  1/3/2019  9:29 AM   ----- Message from [facility] [address], Generic sent at 1/3/2019  9:29 AM EST -----     Dear Dr. Wing Yi,   I went to [facility] ER 4 days ago. I was given only po medications for SOB. I was dx'ed to have bronchitis, no pneumonia. I received prednisone 60 mg/day for 5 days, and an antibiotic Keflex 1 tab tid. They are helping somewhat. I feel  I need both medications for a few  more days .      Thanks,   Zeinab jarquin

## 2019-02-21 NOTE — TELEPHONE ENCOUNTER
From  Sherif Banegas To  [facility] Sent  2/20/2019  6:01 PM   ----- Message from [facility] [address], Generic sent at 2/20/2019  6:01 PM EST -----     Dear Dr. Hill Guerrero. I just ordered Spiriva from a foreign pharmacy. Do you have a sample?    Thank you   Sherif Banegas

## 2019-02-25 ENCOUNTER — TELEPHONE (OUTPATIENT)
Dept: PULMONOLOGY | Age: 74
End: 2019-02-25

## 2019-02-25 NOTE — TELEPHONE ENCOUNTER
From  Dana Patel To  [facility] Nurses Sent  2/24/2019 11:28 PM   ----- Message from [address] [place] [address] [address], Generic sent at 2/24/2019 11:28 PM EST -----     Dear Catalina Cha Your office will be Faxed a prescription request from [facility], "[employer] I get generic Spiriva from them Their price is over $100 less, and I do not notice any difference in the quality of medicine. In fact, I believe I am  under better control. If you have any questions, please do not hesitate to contact me [phone]. Also I want to thank you for the sample, I will pick it up sometime this week.    Thank you   Dana Patel

## 2019-02-27 NOTE — TELEPHONE ENCOUNTER
From  Alejandra Hu To  [facility] Nurses Sent  2/27/2019 10:55 AM   ----- Message from [address] [place] [address] [address], Generic sent at 2/27/2019 10:55 AM EST -----     Dear Dr. Meza Mechelle apologize. ... I need a different  prescription to send to [facility]). Spiriva ( generic)     rotocaps   18 micrograms   3 months supply with 3 refills    Again, I know your time is halima and I am sorry for the confusion.    Alejandra Hu

## 2019-03-25 ENCOUNTER — TELEPHONE (OUTPATIENT)
Dept: PULMONOLOGY | Age: 74
End: 2019-03-25

## 2019-03-25 NOTE — TELEPHONE ENCOUNTER
Spoke with pt. Pt states he has had the DuoNeb for a long time and is running out. Not sure which doctor gave to him. He states he has been using recently due to feeling of sob and tx helps a lot.  Wants a rx sent to [facility] on [address] [facility]

## 2019-03-25 NOTE — TELEPHONE ENCOUNTER
From  Nora Cardenas To  [facility] Nurses Sent  3/24/2019 11:09 PM   ----- Message from [address] [place] [address] [address], Generic sent at 3/24/2019 11:09 PM EDT -----     Dear Dr. Silke Aldana would like a refil for my nebulizer. ...ipratrop./albuterol . 5/3mg   I use 2-3 vials a day. I would appreciate a 3 month supply.    Thank you   Nora Cardenas

## 2019-03-26 RX ORDER — IPRATROPIUM BROMIDE AND ALBUTEROL SULFATE 2.5; .5 MG/3ML; MG/3ML
3 SOLUTION RESPIRATORY (INHALATION)
Qty: 120 NEBULE | Refills: 3 | Status: SHIPPED | OUTPATIENT
Start: 2019-03-26 | End: 2019-03-29 | Stop reason: SDUPTHER

## 2019-03-29 RX ORDER — IPRATROPIUM BROMIDE AND ALBUTEROL SULFATE 2.5; .5 MG/3ML; MG/3ML
3 SOLUTION RESPIRATORY (INHALATION)
Qty: 120 NEBULE | Refills: 3 | Status: SHIPPED | OUTPATIENT
Start: 2019-03-29 | End: 2020-04-01 | Stop reason: SDUPTHER

## 2019-03-29 NOTE — TELEPHONE ENCOUNTER
Spoke with insurance, rejection of coverage is due to running under Part D instead of Part B.  New rx with that info needed

## 2019-06-03 ENCOUNTER — OFFICE VISIT (OUTPATIENT)
Dept: PULMONOLOGY | Age: 74
End: 2019-06-03

## 2019-06-03 VITALS
BODY MASS INDEX: 29.47 KG/M2 | DIASTOLIC BLOOD PRESSURE: 80 MMHG | RESPIRATION RATE: 20 BRPM | SYSTOLIC BLOOD PRESSURE: 128 MMHG | HEART RATE: 72 BPM | HEIGHT: 69 IN | TEMPERATURE: 96.4 F | OXYGEN SATURATION: 99 % | WEIGHT: 199 LBS

## 2019-06-03 DIAGNOSIS — J44.9 ASTHMA WITH COPD (HCC): Primary | ICD-10-CM

## 2019-06-03 DIAGNOSIS — F41.9 ANXIETY: ICD-10-CM

## 2019-06-03 DIAGNOSIS — J41.0 BRONCHITIS, CHRONIC, SIMPLE (HCC): ICD-10-CM

## 2019-06-03 DIAGNOSIS — I42.0 DILATED CARDIOMYOPATHY (HCC): ICD-10-CM

## 2019-06-03 NOTE — PROGRESS NOTES
[facility]  Pulmonary, Critical Care, and Sleep Medicine      Pulmonary Office visit    Name: Judith Credit     : 1945     Date: 6/3/2019        Subjective:   Patient has been referred for evaluation of: SOB    19   Feels significantly improved with combination of LAMA and LABA. [facility] has increased symptoms. More recently he complains of inability to effectively expectorate mucus-feels as though if he can clear it he could breathe better. This is causing him to get more short of breath  Still not able to get in deep breath. Denies any new interval problems  States that his cardiac condition has been fairly well stabilized  Prostrate Ca under control. 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 He was initially found to have cardiomyopathy due to viral illness in . He had an EF of 15% initially. He had normal coronaries at that time. In years past he's had EF's as high as 50% but his most recent echo earlier this year showed an ejection fraction down to 35%. He's had progressive decrease in blood pressures and has had his meds reduced. He's no longer taking hydralazine and his Coreg dose has been decreasd from 50 to 25 mg bid. His main complaints are continued fatigue and shortness of breath. He had asthma as a child and hence the diagnosis of COPD  He denies cigarette smoking. He had been on Spiriva and Advair but had to stop Spiriva about 1 year back due to cost of medication. Never hospitalized for COPD/asthma exacerbations but has had several ER visits for bronchitis, Pneumonia. Admits to reflux. Denies significant cough, dry or productive. Denies nasal congestion, postnasal drainage. C/o severe leg weakness- bilateral.  C/o occasional edema LE. He has had no chest pain. He does have a history of hypertension.  He has had a recent increase in hemoglobin A1C but no history of overt diabetes. He has elevated cholesterol on medications. There is family history of cardiac disease. He denies stroke, rheumatic fever, or claudication. He is currently undergoing radiation therapy for prostrate ca. Had prostrate surgery followed by orchiectomy in 1153-3218. No chemotherapy. PAST SURGICAL HISTORY: Pertinent for a prostatectomy in 2015, orchiectomy in 2016, hiatal hernia repair in 2013 which failed, and total knee replacement on the right. MEDICAL HISTORY: Pertinent for the cardiomyopathy, hypertension, COPD with asthma, Mason's esophagus, pectus excavatum with pectoral implants. SOB:   Symptoms occur on exertion. Able to walk about 200  Feet. Cannot climb stairs.  Activities of daily living are affected  Denies orthopnea/ paroxysmal nocturnal dyspnea  SOB relieved with rest, inhalers/ nebulizers    Imaging studies- CXR  Other testing- PFT, 6 min walk  Comorbid conditions include- HTN, Cardiomyopathy, Neuropathy, Mason's esophagus, Hiatal hernia, Pectus deformity  Never Smoker  Occupational exposure-none    Past Medical History:   Diagnosis Date    Abdominal wall hernia     Anxiety     Asthma     Mason's esophagus with esophagitis     Cardiomyopathy     Chronic fatigue     Chronic lung disease     CKD (chronic kidney disease), stage III (HCC)     COPD (chronic obstructive pulmonary disease) ([address]     Coronary artery disease     Decreased hearing     Deficiency anemia     Depression     DJD (degenerative joint disease)     Esophageal reflux     Hiatal hernia     Hiatus hernia syndrome     History of blood transfusion     HLD (hyperlipidemia)     HTN (hypertension)     Major depressive disorder     Mixed incontinence     Personal history of prostate cancer     Prostate cancer ([address]     Christopher 7    Prostate cancer metastatic to bone ([address]     Renal disease     Renal failure     Urinary incontinence     Urinary tract infection, site not specified     UTI (urinary tract infection)     Vitamin D deficiency     Wears dentures        Past Surgical History:   Procedure Laterality Date    BIOPSY PROSTATE      [id number] gl4+3.  HX HERNIA REPAIR      HX KNEE REPLACEMENT      HX ORCHIECTOMY  08/11/2016    HX OTHER SURGICAL      IMPLANTS    HX OTHER SURGICAL      TOTAL KNEE/ TOTAL HIP REPLACEMENT PRE OP ORDERSET    HX OTHER SURGICAL  08/11/2016    HERNIA REPAIR LAPAROSCOPIC    HX OTHER SURGICAL  08/11/2016    TESTICULAR PROSTHESIS INSERTION    HX RADICAL PROSTATECTOMY  12/09/[place]       Allergies   Allergen Reactions    Egg Unable to Obtain    Ketorolac Tromethamine Other (comments)     Kidney insufficient    Nsaids (Non-Steroidal Anti-Inflammatory Drug) Other (comments)    Prilosec [Omeprazole] Other (comments)     Flank and abd pain    Proton Pump Inhibitors Other (comments)     Current Outpatient Medications   Medication Sig Dispense Refill    fluticasone-umeclidinium-vilanterol (TRELEGY ELLIPTA) 100-62.5-25 mcg inhaler Take 1 Puff by inhalation daily. 2 Inhaler 0    Mucus Clearing Device (AEROBIKA OSCILLATING PEP SYSTM) bharathi 1 Each by Does Not Apply route two (2) times a day. 1 Device 0    albuterol-ipratropium (DUO-NEB) 2.5 mg-0.5 mg/3 ml nebu 3 mL by Nebulization route every six (6) hours as needed for Other (wheezing). File under Medicare Part B, ICD# J44.9 120 Nebule 3    tiotropium (SPIRIVA WITH HANDIHALER) 18 mcg inhalation capsule Take 1 Cap by inhalation daily. 10 Cap 0    carvedilol (COREG) 12.5 mg tablet TK 2 TS PO BID  3    hydrALAZINE (APRESOLINE) 10 mg tablet TK 1 T PO TID  1    aspirin delayed-release 81 mg tablet 81 mg.      furosemide (LASIX) 40 mg tablet 20 mg every other day.  pravastatin (PRAVACHOL) 20 mg tablet TAKE 1 TABLET EVERY EVENING      traZODone (DESYREL) 300 mg tablet 400 mg nightly.       QUEtiapine (SEROQUEL) 25 mg tablet TK 3 TS PO QHS  0    denosumab (PROLIA) 60 mg/mL injection 1 mL by SubCUTAneous route every 6 months. 60 mL 0    PROAIR HFA 90 mcg/actuation inhaler       tiotropium (SPIRIVA WITH HANDIHALER) 18 mcg inhalation capsule Take 1 Cap by inhalation daily. 90 Cap 3    tiotropium bromide (SPIRIVA RESPIMAT) 2.5 mcg/actuation inhaler Take 2 Puffs by inhalation daily. 3 Inhaler 3    fluticasone-umeclidin-vilanter (TRELEGY ELLIPTA) 100-62.5-25 mcg dsdv Take 1 Actuation(s) by inhalation daily. 2 Each 0    mirabegron ER (MYRBETRIQ) 25 mg ER tablet Take 1 Tab by mouth daily. 30 Tab 6    zolpidem (AMBIEN) 10 mg tablet       cholecalciferol (VITAMIN D3) 1,000 unit tablet 5,000 Units.  pramipexole (MIRAPEX) 0.25 mg tablet Take 0.25 mg by mouth daily as needed.  ondansetron (ZOFRAN ODT) 4 mg disintegrating tablet 8 mg.  raNITIdine (ZANTAC) 150 mg tablet TAKE 1 TAB BY MOUTH TWICE DAILY.   3         Review of Systems:  HEENT: No epistaxis, no nasal drainage, no difficulty in swallowing, no redness in eyes  Respiratory: as above  Cardiovascular: no chest pain, no palpitations, no chronic leg edema, no syncope  Gastrointestinal: no abd pain, no vomiting, no diarrhea, no bleeding symptoms  Genitourinary: No urinary symptoms or hematuria  Integument/breast: No ulcers or rashes  Musculoskeletal:Neg  Neurological: No focal weakness, no seizures, no headaches  Behvioral/Psych: No anxiety, no depression  Constitutional: No fever, no chills, ++ weight loss, no night sweats     Objective:     Visit Vitals  /80 (BP 1 Location: Left arm, BP Patient Position: Sitting)   Pulse 72   Temp 96.4 °F (35.8 °C)   Resp 20   Ht 5' 8.5\" (1.74 m)   Wt 90.3 kg (199 lb)   SpO2 99%   BMI 29.82 kg/m²        Physical Exam:   General: comfortable, no acute distress  HEENT: pupils reactive, sclera anicteric, EOM intact  Neck: No adenopathy or thyroid swelling, no lymphadenopathy or JVD, supple  CVS: S1S2 no murmurs  RS: Mod AE bilaterally, no tactile fremitus or egophony, no accessory muscle use  Abd: soft, non tender, no hepatosplenomegaly  Neuro: non focal, awake, alert  Extrm: no leg edema, clubbing or cyanosis  Skin: no rash    Data review:   Pertinent labs: CBC, BMP, LFT's  PSA- 9.7    PFT:    Date FVC% FEV1  FEV1/FVC HCK19-89 TLC RV RV/TLC VC DLCO   7/14/2017 48 32 67 17                                              PFT:9/28/16  FVC 2.12 (51 %)  FEV1 1.29 (43 %)->+24% post BD  FEV1/FVC 61 %  TLC 80 %   %  DL/VA 63 %    SIX MINUTE WALK: 8/17/16    Interpretation: Ambulatory oximetry Does not show significant desaturation;starting O2 sat 98 %; low O2 sat 97 %;   starting HR 70 bpm; high HR 79 bpm  Dyspnea scale: beginning 3; ending 5  Total distance : 770 ft   Total time: 6 minutes      Echo: 7/10/2017:  Normal LV function normal, RV dilated. NORMAL LEFT VENTRICULAR CAVITY SIZE WITH MODERATE CONCENTRIC LEFT VENTRICULAR   HYPERTROPHY. REDUCED LEFT VENTRICULAR EJECTION FRACTION BUT EXACT EF  CANNOT BE DETERMINED WITH THIS   STUDY   MILD (STAGE I) DIASTOLIC DYSFUNCTION. MODERATELY DILATED LEFT ATRIUM. DILATED RIGHT VENTRICULAR SIZE WITH NORMAL SYSTOLIC FUNCTION. NO HEMODYNAMICALLY SIGNIFICANT VALVULAR PATHOLOGY. ESTIMATED PULMONARY ARTERY PRESSURE IS 32 MMHG. AORTIC ROOT DILATATION. NO OBVIOUS MASSES, SHUNTS OR THROMBI SEEN. Imaging:  I have personally reviewed the patients radiographs and have reviewed the reports:  V/Q scan 8/3/2017: Moderate central deposition of radiotracer on ventilatory phase images, suggestive of nonlaminar/turbulent flow, most often seen with COPD. There is no evidence of moderate or large mismatched segmental perfusion defect to indicate the presence of pulmonary embolism. IMPRESSION:     Low probability for pulmonary embolism. CXR:2/1/17  Cardiac enlargement with no acute infiltrate. There is a moderate size hiatal hernia. Calcified hilar and mediastinal lymph nodes indicating old granulomatous disease.  No acute infiltrate or edema is identified    CXR: 10/18/16 CXR independently reviewed and results discussed with pt. Improved aeration with decreasing airspace disease right lower lobe. Mild parenchymal scarring. Stable cardiomegaly. Hiatal hernia. CXR: 10/13/16:  New airspace consolidation in the lateral right midlung on the PA view suspicious for pneumonia. Stable cardiomegaly. Stable linear parenchymal scarring in the left midlung. Persistent hiatal hernia.          Patient Active Problem List   Diagnosis Code    Essential hypertension I10    COPD (chronic obstructive pulmonary disease) ([address] J44.9    Mason's esophagus K22.70    Multiple-type hyperlipidemia E78.2    Major depressive disorder, recurrent severe without psychotic features ([address] F33.2    Cardiomyopathy ([address] I42.9    Prostate cancer, [id number]  dvp Vani [id number] gl4+3(tertiary 5) psa gray 2.2 with bone mets C61    Bone Metastases from [place], C79.51    Mixed incontinence N39.46    Urinary tract infection N39.0    Chronic fatigue R53.82    Wears dentures Z97.2    Urinary tract infection, site not specified N39.0    Urinary incontinence R32    Renal failure N19    Renal disease N28.9    Personal history of prostate cancer Z85.46    History of blood transfusion Z92.89    Hiatus hernia syndrome K44.9    Hiatal hernia K44.9    Esophageal reflux K21.9    DJD (degenerative joint disease) M19.90    Depression F32.9    Deficiency anemia D53.9    Decreased hearing H91.90    Coronary artery disease I25.10    Asthma J45.909    Anxiety F41.9    Abdominal wall hernia K43.9    Chronic low back pain M54.5, G89.29    History of surgical procedure Z98.890    Incisional hernia K43.2    Atelectasis J98.11    Pneumonia due to infectious organism J18.9    Bronchitis, chronic, simple (HCC) J41.0    Vitamin D deficiency E55.9    Major depressive disorder F32.9    HTN (hypertension) I10    HLD (hyperlipidemia) E78.5    Mason's esophagus with esophagitis K22.70, K20.9    CHF (congestive heart failure) ([facility]) I50.9    Chronic kidney disease, stage III (moderate) ([facility]) N18.3    Hx of bacterial pneumonia Z87.01    Hx of fracture of rib Z87.81    Low testosterone R79.89    Osteopenia M85.80    Prediabetes R73.03    Restless legs syndrome G25.81     IMPRESSION:   · SOB: multifactorial. Predominant ASTHMA-COPD Significant Pulmonary functional impairment from  Obstructive airway component: advance asthma- COPD airway remodeling over the years with additional restriction from neuromuscular/skeletal contributors: pectus deformity with reconstruction and hormonal metaboliceffects ? Additional role from hiatal hernia and GERD. VTE ruled out with V/Q scan-low probability. Additional cardiovascular factors- cardiomyopathy, systolic and daistolic failure   · Chronic obstructive bronchitis with recent exacerbation  · Cardiomyopathy  · Hiatal hernia  · Anxiety-depression  · Prostrate ca      RECOMMENDATIONS:   · Will continue to optimize treatment for obstructive airways defect component-Trelegy samples provided   · Will add airway clearance device in the form of Aerobika  · Written Prescriptions will be given at patient request.  · Early intervention for exacerbations, control triggers- GERD/hiatal hernia factors  · Will benefit from Pulmonary rehab-  · Will check PFT's and 6 min walk as clinically indicated  · Aspiration precautions and GERD treatment  · Discussed in details plan of care and answered questions to the best of my ability. · Will follow up in 6 months     Health maintenance screens deferred to Primary care provider.      Alexandrea Garcia MD

## 2019-08-22 NOTE — PROGRESS NOTES
The pt. C/o occasional wheezing over the past 3 months. SOB at times. Chely Carranza presents today for   Chief Complaint   Patient presents with    Breathing Problem     F/U to 12/12/18    Wheezing       Is someone accompanying this pt? No  Is the patient using any DME equipment? Nebulizer which he says is aged. He refused the one ordered Jan. 2018. -DME Company Unknown. He states we ordered it a year ago but, he refused the new Neb. Feeling it was \"old style\". He was told It was what Medicare covered. He questions it may have been [employer]. Depression Screening:      Learning Assessment:  Learning Assessment 7/14/2017   PRIMARY LEARNER Patient   PRIMARY LANGUAGE ENGLISH   LEARNER PREFERENCE PRIMARY DEMONSTRATION     LISTENING     PICTURES     READING     VIDEOS   ANSWERED BY Patient   RELATIONSHIP SELF       Abuse Screening:  No    Fall Risk  Fall Risk Assessment, last 12 mths 6/3/2019   Able to walk? (No Data)   Fall in past 12 months? -   Fall with injury? -   Number of falls in past 12 months -   Fall Risk Score -         Coordination of Care:  1. Have you been to the ER, urgent care clinic since your last visit? Hospitalized since your last visit? no    2. Have you seen or consulted any other health care providers outside of the [address] [place] [address] since your last visit? MDs at [facility]. No recent exams    Medication variance in dosage/sig per patient as follows: Per Med. Rec. Dr Dobbins

## 2019-11-12 ENCOUNTER — TELEPHONE (OUTPATIENT)
Dept: PULMONOLOGY | Age: 74
End: 2019-11-12

## 2019-11-12 NOTE — TELEPHONE ENCOUNTER
From  Kimberly Cabello To  [facility] Sent  11/11/2019  9:05 PM   Dear Dr. Julianna Hair prescribe me a Trilegy for my pharmacy at [place].    Thanks   Everett Gifford

## 2019-11-13 NOTE — TELEPHONE ENCOUNTER
Pt has [employer], they will cover Advair Diskus 500-50 and Spiriva handihaler which the pt states he was on before he tried to get the Trelegy and had no problems taking.  Wants to stay with these meds

## 2019-11-14 ENCOUNTER — TELEPHONE (OUTPATIENT)
Dept: PULMONOLOGY | Age: 74
End: 2019-11-14

## 2019-11-14 RX ORDER — FLUTICASONE PROPIONATE AND SALMETEROL 500; 50 UG/1; UG/1
1 POWDER RESPIRATORY (INHALATION) 2 TIMES DAILY
Qty: 1 INHALER | Refills: 5 | Status: SHIPPED | OUTPATIENT
Start: 2019-11-14 | End: 2019-11-15

## 2019-11-14 NOTE — TELEPHONE ENCOUNTER
PT CALLED([phone]). PLEASE HAVE SOFIA-AN CALL IN BREO TO Toi Tracie [phone]. ADVAIR WAS SENT TO [facility] BUT IT IS NOT IN HIS FORMULARY. PLEASE CALL IN ANY QUESTIONS.

## 2019-11-15 ENCOUNTER — TELEPHONE (OUTPATIENT)
Dept: PULMONOLOGY | Age: 74
End: 2019-11-15

## 2019-11-15 DIAGNOSIS — J44.9 CHRONIC OBSTRUCTIVE PULMONARY DISEASE, UNSPECIFIED COPD TYPE (HCC): Primary | ICD-10-CM

## 2019-11-15 RX ORDER — FLUTICASONE FUROATE AND VILANTEROL 100; 25 UG/1; UG/1
1 POWDER RESPIRATORY (INHALATION) DAILY
Qty: 1 INHALER | Refills: 3 | Status: SHIPPED | OUTPATIENT
Start: 2019-11-15 | End: 2019-11-20

## 2019-11-20 ENCOUNTER — TELEPHONE (OUTPATIENT)
Dept: PULMONOLOGY | Age: 74
End: 2019-11-20

## 2019-11-20 DIAGNOSIS — J44.9 CHRONIC OBSTRUCTIVE PULMONARY DISEASE, UNSPECIFIED COPD TYPE (HCC): Primary | ICD-10-CM

## 2019-11-20 RX ORDER — BUDESONIDE AND FORMOTEROL FUMARATE DIHYDRATE 160; 4.5 UG/1; UG/1
2 AEROSOL RESPIRATORY (INHALATION) 2 TIMES DAILY
Qty: 1 INHALER | Refills: 3 | Status: SHIPPED | OUTPATIENT
Start: 2019-11-20 | End: 2020-12-10 | Stop reason: CLARIF

## 2019-11-20 NOTE — TELEPHONE ENCOUNTER
Left message advising Advair was not covered as we had tried earlier this month and again today.  Symbicort covered in this class of medications and sent to pharmacy

## 2019-11-20 NOTE — TELEPHONE ENCOUNTER
I called Haily re what is covered on the pt's [facility] plan. Advair is not covered either. The only med in this class is Symbicort. No to Trelegy, Advair, Breo or Dulera.

## 2019-11-20 NOTE — TELEPHONE ENCOUNTER
Dear Staff,   Enma Vance asked for a prescription of advair discus. .... unfortunately it is NOT on my formulary and would cost $600. Please call in bREO which is on my formulary. Sorry for the confusion.    Sincerely, Jude Hooks

## 2020-03-30 ENCOUNTER — TELEPHONE (OUTPATIENT)
Dept: PULMONOLOGY | Age: 75
End: 2020-03-30

## 2020-03-30 NOTE — TELEPHONE ENCOUNTER
Pt states he would like to know what medications he's currently supposed to be taking. Please advise [phone].

## 2020-04-01 ENCOUNTER — VIRTUAL VISIT (OUTPATIENT)
Dept: PULMONOLOGY | Age: 75
End: 2020-04-01

## 2020-04-01 DIAGNOSIS — N18.30 CHRONIC KIDNEY DISEASE, STAGE III (MODERATE) (HCC): ICD-10-CM

## 2020-04-01 DIAGNOSIS — J44.0 CHRONIC OBSTRUCTIVE PULMONARY DISEASE WITH ACUTE LOWER RESPIRATORY INFECTION (HCC): Primary | ICD-10-CM

## 2020-04-01 DIAGNOSIS — J44.9 CHRONIC OBSTRUCTIVE PULMONARY DISEASE, UNSPECIFIED COPD TYPE (HCC): ICD-10-CM

## 2020-04-01 DIAGNOSIS — J45.30 MILD PERSISTENT ASTHMA WITHOUT COMPLICATION: ICD-10-CM

## 2020-04-01 DIAGNOSIS — I42.0 DILATED CARDIOMYOPATHY (HCC): ICD-10-CM

## 2020-04-01 RX ORDER — QUETIAPINE FUMARATE 100 MG/1
100 TABLET, FILM COATED ORAL
COMMUNITY

## 2020-04-01 RX ORDER — BUDESONIDE AND FORMOTEROL FUMARATE DIHYDRATE 160; 4.5 UG/1; UG/1
2 AEROSOL RESPIRATORY (INHALATION) 2 TIMES DAILY
Qty: 1 INHALER | Refills: 5 | Status: SHIPPED | OUTPATIENT
Start: 2020-04-01 | End: 2020-12-10 | Stop reason: CLARIF

## 2020-04-01 RX ORDER — PREDNISONE 10 MG/1
5 TABLET ORAL 2 TIMES DAILY
COMMUNITY

## 2020-04-01 RX ORDER — GABAPENTIN 300 MG/1
300 CAPSULE ORAL AS NEEDED
COMMUNITY

## 2020-04-01 RX ORDER — BUDESONIDE AND FORMOTEROL FUMARATE DIHYDRATE 80; 4.5 UG/1; UG/1
2 AEROSOL RESPIRATORY (INHALATION) 2 TIMES DAILY
Qty: 2 INHALER | Refills: 0 | Status: SHIPPED | COMMUNITY
Start: 2020-04-01 | End: 2020-09-11 | Stop reason: SDUPTHER

## 2020-04-01 RX ORDER — BUDESONIDE AND FORMOTEROL FUMARATE DIHYDRATE 160; 4.5 UG/1; UG/1
2 AEROSOL RESPIRATORY (INHALATION) 2 TIMES DAILY
Qty: 2 INHALER | Refills: 0 | Status: SHIPPED | COMMUNITY
Start: 2020-04-01 | End: 2020-04-01 | Stop reason: CLARIF

## 2020-04-01 RX ORDER — IPRATROPIUM BROMIDE AND ALBUTEROL SULFATE 2.5; .5 MG/3ML; MG/3ML
3 SOLUTION RESPIRATORY (INHALATION)
Qty: 120 NEBULE | Refills: 3 | Status: SHIPPED | OUTPATIENT
Start: 2020-04-01 | End: 2020-04-08

## 2020-04-01 NOTE — PROGRESS NOTES
Adama Razo is a 76 y.o. male who was seen by synchronous (real-time) audio-video technology on 4/1/2020. Consent:  He and/or his healthcare decision maker is aware that this patient-initiated Telehealth encounter is a billable service, with coverage as determined by his insurance carrier. He is aware that he may receive a bill and has provided verbal consent to proceed: Yes    I was in the office while conducting this encounter. Assessment & Plan:   Diagnoses and all orders for this visit:    SOB: multifactorial. Predominant ASTHMA-COPD Significant Pulmonary functional impairment from  Obstructive airway component: advance asthma- COPD airway remodeling over the years with additional restriction from neuromuscular/skeletal contributors: pectus deformity with reconstruction and hormonal metaboliceffects ? Additional role from hiatal hernia and GERD. VTE ruled out with V/Q scan-low probability. Additional cardiovascular factors- cardiomyopathy, systolic and daistolic failure . S/p recent exacerbation secondary to lower respiratory tract infection-left lower lobe pneumonia poor documentation from urgent care.   Clinically improved and resolving  Chronic obstructive pulmonary disease with acute lower respiratory infection (HCC)  Dilated cardiomyopathy (HCC)  Mild persistent asthma without complication  Chronic kidney disease, stage III (moderate) (HCC  · Hiatal hernia  · Anxiety-depression  · Prostrate ca  Plan/Recommendations:  · Will continue to optimize treatment for obstructive airways defect component-refills for Spiriva and Symbicort provided-printed prescriptions per patient request since he mails his prescriptions to [place]  · Written Prescriptions will be given at patient request.  · He will complete the taper of prednisone  · Discussed proper dosing of his maintenance bronchodilators and rescue medications  · Early intervention for exacerbations, control triggers- GERD/hiatal hernia factors  · Aspiration precautions and GERD treatment  · Discussed in details plan of care and answered questions to the best of my ability. · In addition answered questions regarding patient's concerns about joel COVID-19 and answered his questions regarding pros and cons of any prophylactic medications    Will follow up in 6 months    712  Subjective:   Gerry De La Rosa Situ was seen for Breathing Problem (F/U to 6/3/19)    For virtual follow up after recent illness. Went to urgent care for c/o SOB. Patient placed on Antibiotic for 10 days. [facility] Was prescribed stera pred. Less short of breath at this time. CXR showed LLL pneumonia per patient -Feels much better. Continues to have some wheezing . Continues to use Spiriva handihaler  Denies any swelling of his lower extremities  Denies fever or chills    Prior to Admission medications    Medication Sig Start Date End Date Taking? Authorizing Provider   gabapentin (Neurontin) 300 mg capsule Take 300 mg by mouth as needed for Pain. One to two tabs as needed for leg cramps   Yes Provider, Historical   predniSONE (DELTASONE) 10 mg tablet Take 5 mg by mouth two (2) times a day. Yes Provider, Historical   QUEtiapine (SEROqueL) 100 mg tablet Take 100 mg by mouth nightly. Yes Provider, Historical   budesonide-formoteroL (SYMBICORT) 160-4.5 mcg/actuation HFAA Take 2 Puffs by inhalation two (2) times a day. 4/1/20  Yes Michel Tom MD   budesonide-formoteroL (SYMBICORT) 160-4.5 mcg/actuation HFAA Take 2 Puffs by inhalation two (2) times a day. 4/1/20  Yes Michel Tom MD   tiotropium (Spiriva with HandiHaler) 18 mcg inhalation capsule Take 1 Cap by inhalation daily. 4/1/20  Yes Michel Tom MD   solifenacin (VESICARE) 5 mg tablet Take 1 Tab by mouth daily. 3/5/20  Yes Olivia Bowden MD   famotidine (PEPCID) 20 mg tablet Take 20 mg by mouth two (2) times a day.    Yes Provider, Historical budesonide-formoterol (SYMBICORT) 160-4.5 mcg/actuation HFAA Take 2 Puffs by inhalation two (2) times a day. 11/20/19  Yes Nataly CALLEJAS NP   albuterol-ipratropium (DUO-NEB) 2.5 mg-0.5 mg/3 ml nebu 3 mL by Nebulization route every six (6) hours as needed for Other (wheezing). File under Medicare Part B, ICD# J44.9 3/29/19  Yes Dorina Verduzco MD   carvedilol (COREG) 12.5 mg tablet TK 2 TS PO BID 6/12/18  Yes Provider, Historical   hydrALAZINE (APRESOLINE) 10 mg tablet TK 1 T PO TID 5/12/18  Yes Provider, Historical   aspirin delayed-release 81 mg tablet 81 mg. Yes Provider, Historical   furosemide (LASIX) 40 mg tablet 20 mg every other day. Yes Provider, Historical   pramipexole (MIRAPEX) 0.25 mg tablet Take 0.25 mg by mouth daily as needed. 5/17/17  Yes Provider, Historical   pravastatin (PRAVACHOL) 20 mg tablet TAKE 1 TABLET EVERY EVENING 5/1/17  Yes Provider, Historical   traZODone (DESYREL) 300 mg tablet 400 mg nightly. Yes Provider, Historical   PROAIR HFA 90 mcg/actuation inhaler  5/24/17  Yes Provider, Historical   ondansetron (ZOFRAN ODT) 4 mg disintegrating tablet 8 mg. 11/30/16  Yes Provider, Historical   trospium (SANCTURA) 20 mg tablet Take 1 Tab by mouth two (2) times a day. 2/26/20   January Donaldson MD   Mucus Clearing Device (AEROBIKA OSCILLATING PEP SYSTM) bharathi 1 Each by Does Not Apply route two (2) times a day.  6/3/19   Dorina Verduzco MD   QUEtiapine (SEROQUEL) 25 mg tablet TK 3 TS PO QHS 6/5/17   Provider, Historical     Allergies   Allergen Reactions    Egg Unable to Obtain    Ketorolac Tromethamine Other (comments)     Kidney insufficient    Nsaids (Non-Steroidal Anti-Inflammatory Drug) Other (comments)    Prilosec [Omeprazole] Other (comments)     Flank and abd pain    Proton Pump Inhibitors Other (comments)       Patient Active Problem List   Diagnosis Code    Essential hypertension I10    COPD (chronic obstructive pulmonary disease) [facility] J44.9    Mason's esophagus K22.70    Multiple-type hyperlipidemia E78.2    Major depressive disorder, recurrent severe without psychotic features ([facility] F33.2    Cardiomyopathy ([facility] I42.9    Prostate cancer, [id number]  dvp Vani [id number] gl4+3(tertiary 5) psa gray 2.2 with bone mets C61    Bone Metastases from [place], C79.51    Mixed incontinence N39.46    Urinary tract infection N39.0    Chronic fatigue R53.82    Wears dentures Z97.2    Urinary tract infection, site not specified N39.0    Urinary incontinence R32    Renal failure N19    Renal disease N28.9    Personal history of prostate cancer Z85.46    History of blood transfusion Z92.89    Hiatus hernia syndrome K44.9    Hiatal hernia K44.9    Esophageal reflux K21.9    DJD (degenerative joint disease) M19.90    Depression F32.9    Deficiency anemia D53.9    Decreased hearing H91.90    Coronary artery disease I25.10    Asthma J45.909    Anxiety F41.9    Abdominal wall hernia K43.9    Chronic low back pain M54.5, G89.29    History of surgical procedure Z98.890    Incisional hernia K43.2    Atelectasis J98.11    Pneumonia due to infectious organism J18.9    Bronchitis, chronic, simple ([facility]) J41.0    Vitamin D deficiency E55.9    Major depressive disorder F32.9    HTN (hypertension) I10    HLD (hyperlipidemia) E78.5    Mason's esophagus with esophagitis K22.70, K20.9    CHF (congestive heart failure) ([facility]) I50.9    Chronic kidney disease, stage III (moderate) ([facility]) N18.3    Hx of bacterial pneumonia Z87.01    Hx of fracture of rib Z87.81    Low testosterone R79.89    Osteopenia M85.80    Prediabetes R73.03    Restless legs syndrome G25.81       Review of Systems   Constitutional: Positive for fatigue. Negative for appetite change and unexpected weight change. HENT: Negative for congestion, postnasal drip and rhinorrhea. Eyes: Negative for discharge. Respiratory: Positive for chest tightness and shortness of breath. Cardiovascular: Negative for chest pain and leg swelling. Neurological: Negative for dizziness. There were no vitals taken for this visit. Constitutional: [x] Appears well-developed and well-nourished [x] No apparent distress        Mental status: [x] Alert and awake  [x] Oriented to person/place/time [x] Able to follow commands    [] Abnormal -     Eyes:   EOM    [x]  Normal        Sclera  [x]  Normal        HENT: [x] Normocephalic, atraumatic    [x] Mouth/Throat: Mucous membranes are moist    External Ears [x] Normal      Neck: [x] No visualized mass     Pulmonary/Chest: [x] Respiratory effort normal   [x] No visualized signs of difficulty breathing or respiratory distress               Skin:        [x] No significant exanthematous lesions or discoloration noted on facial skin                   Psychiatric:       [x] Normal Affect           Other pertinent observable physical exam findings:-        We discussed the expected course, resolution and complications of the diagnosis(es) in detail. Medication risks, benefits, costs, interactions, and alternatives were discussed as indicated. I advised him to contact the office if his condition worsens, changes or fails to improve as anticipated. He expressed understanding with the diagnosis(es) and plan. Pursuant to the emergency declaration under the [facility], [facility] waiver authority and the [employer] Act, this Virtual  Visit was conducted, with patient's consent, to reduce the patient's risk of exposure to COVID-19 and provide continuity of care for an established patient. Services were provided through a video synchronous discussion virtually to substitute for in-person clinic visit.     Windsor Cooks, MD

## 2020-04-08 RX ORDER — IPRATROPIUM BROMIDE AND ALBUTEROL SULFATE 2.5; .5 MG/3ML; MG/3ML
SOLUTION RESPIRATORY (INHALATION)
Qty: 360 ML | Refills: 3 | Status: SHIPPED | OUTPATIENT
Start: 2020-04-08 | End: 2021-01-12

## 2020-09-11 ENCOUNTER — TELEPHONE (OUTPATIENT)
Dept: PULMONOLOGY | Age: 75
End: 2020-09-11

## 2020-09-11 RX ORDER — BUDESONIDE AND FORMOTEROL FUMARATE DIHYDRATE 80; 4.5 UG/1; UG/1
2 AEROSOL RESPIRATORY (INHALATION) 2 TIMES DAILY
Qty: 2 INHALER | Refills: 0 | Status: SHIPPED | COMMUNITY
Start: 2020-09-11 | End: 2020-12-10 | Stop reason: CLARIF

## 2020-11-03 ENCOUNTER — TELEPHONE (OUTPATIENT)
Dept: PULMONOLOGY | Age: 75
End: 2020-11-03

## 2020-12-10 ENCOUNTER — TELEPHONE (OUTPATIENT)
Dept: PULMONOLOGY | Age: 75
End: 2020-12-10

## 2020-12-10 RX ORDER — FLUTICASONE FUROATE AND VILANTEROL TRIFENATATE 100; 25 UG/1; UG/1
1 POWDER RESPIRATORY (INHALATION) DAILY
Qty: 2 INHALER | Refills: 0 | Status: SHIPPED | COMMUNITY
Start: 2020-12-10 | End: 2021-09-02

## 2020-12-10 NOTE — TELEPHONE ENCOUNTER
Patient advised we do not get samples of Symbicort any longer. 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   Review formulary and Dony Jenkins is on formulary and samples are available

## 2021-01-12 RX ORDER — IPRATROPIUM BROMIDE AND ALBUTEROL SULFATE 2.5; .5 MG/3ML; MG/3ML
SOLUTION RESPIRATORY (INHALATION)
Qty: 360 ML | Refills: 3 | Status: SHIPPED | OUTPATIENT
Start: 2021-01-12 | End: 2021-09-24 | Stop reason: SDUPTHER

## 2021-01-29 ENCOUNTER — TELEPHONE (OUTPATIENT)
Dept: PULMONOLOGY | Age: 76
End: 2021-01-29

## 2021-01-29 NOTE — TELEPHONE ENCOUNTER
Patient calling to advise the [address] [place] [address] J is sending a fax with the medications to refill coming to Dr. Garcia Zamudio. She would sign and fax back to them.

## 2021-01-29 NOTE — TELEPHONE ENCOUNTER
Pt called([phone]). Needs Dr Lor Keene to do authorizations for all of his scripts to [address] [place] [address]

## 2021-03-04 NOTE — TELEPHONE ENCOUNTER
[facility] Rehab called patient and spoke to him about the program. He wants a call back in 3 weeks. Will follow up at that time.      Thank you,  Rosemarie Conrad Statement Selected

## 2021-04-07 ENCOUNTER — OFFICE VISIT (OUTPATIENT)
Dept: PULMONOLOGY | Age: 76
End: 2021-04-07
Payer: MEDICARE

## 2021-04-07 VITALS
RESPIRATION RATE: 20 BRPM | HEIGHT: 66 IN | OXYGEN SATURATION: 98 % | WEIGHT: 190 LBS | BODY MASS INDEX: 30.53 KG/M2 | HEART RATE: 68 BPM | DIASTOLIC BLOOD PRESSURE: 60 MMHG | TEMPERATURE: 98.1 F | SYSTOLIC BLOOD PRESSURE: 100 MMHG

## 2021-04-07 DIAGNOSIS — J41.0 BRONCHITIS, CHRONIC, SIMPLE (HCC): ICD-10-CM

## 2021-04-07 DIAGNOSIS — R06.02 SOB (SHORTNESS OF BREATH) ON EXERTION: ICD-10-CM

## 2021-04-07 DIAGNOSIS — J45.30 MILD PERSISTENT ASTHMA WITHOUT COMPLICATION: Primary | ICD-10-CM

## 2021-04-07 DIAGNOSIS — J44.9 ASTHMA-COPD OVERLAP SYNDROME (HCC): ICD-10-CM

## 2021-04-07 DIAGNOSIS — I42.0 DILATED CARDIOMYOPATHY (HCC): ICD-10-CM

## 2021-04-07 DIAGNOSIS — C61 PROSTATE CANCER (HCC): ICD-10-CM

## 2021-04-07 PROCEDURE — 94618 PULMONARY STRESS TESTING: CPT | Performed by: INTERNAL MEDICINE

## 2021-04-07 PROCEDURE — G8536 NO DOC ELDER MAL SCRN: HCPCS | Performed by: INTERNAL MEDICINE

## 2021-04-07 PROCEDURE — 99214 OFFICE O/P EST MOD 30 MIN: CPT | Performed by: INTERNAL MEDICINE

## 2021-04-07 PROCEDURE — 3017F COLORECTAL CA SCREEN DOC REV: CPT | Performed by: INTERNAL MEDICINE

## 2021-04-07 PROCEDURE — G8752 SYS BP LESS 140: HCPCS | Performed by: INTERNAL MEDICINE

## 2021-04-07 PROCEDURE — G9717 DOC PT DX DEP/BP F/U NT REQ: HCPCS | Performed by: INTERNAL MEDICINE

## 2021-04-07 PROCEDURE — 1101F PT FALLS ASSESS-DOCD LE1/YR: CPT | Performed by: INTERNAL MEDICINE

## 2021-04-07 PROCEDURE — G8754 DIAS BP LESS 90: HCPCS | Performed by: INTERNAL MEDICINE

## 2021-04-07 PROCEDURE — G8427 DOCREV CUR MEDS BY ELIG CLIN: HCPCS | Performed by: INTERNAL MEDICINE

## 2021-04-07 PROCEDURE — G8417 CALC BMI ABV UP PARAM F/U: HCPCS | Performed by: INTERNAL MEDICINE

## 2021-04-07 RX ORDER — ALBUTEROL SULFATE 90 UG/1
2 AEROSOL, METERED RESPIRATORY (INHALATION)
Qty: 1 INHALER | Refills: 4 | Status: SHIPPED | OUTPATIENT
Start: 2021-04-07 | End: 2021-09-02 | Stop reason: SDUPTHER

## 2021-04-07 RX ORDER — BUDESONIDE, GLYCOPYRROLATE, AND FORMOTEROL FUMARATE 160; 9; 4.8 UG/1; UG/1; UG/1
2 AEROSOL, METERED RESPIRATORY (INHALATION) 2 TIMES DAILY
Qty: 3 INHALER | Refills: 0 | Status: SHIPPED | COMMUNITY
Start: 2021-04-07 | End: 2021-09-02

## 2021-04-07 NOTE — LETTER
4/7/2021 Patient: Candy Purvis YOB: 1945 Date of Visit: 4/7/2021 Harvey Bhardwaj NP 
[address] [place] [address] Via Fax: [phone] Dear Harvey Bhardwaj NP, Thank you for referring Mr. Candy Purvis to [address] [place] [address] for evaluation. My notes for this consultation are attached. If you have questions, please do not hesitate to call me. I look forward to following your patient along with you.  
 
 
Sincerely, 
 
Дмитрий Thornton MD 
 

no

## 2021-04-07 NOTE — PROGRESS NOTES
The pt. Cannot afford [facility] [facility] Meds. He is now voicing that the [place] ([facility] meds are no longer as efficient as they were in past.    He could use samples of inhalers. I did tell him that Albuterol MDIs are not sampled anymore. He feels his resp. Status is declining. Overall health is deteriorating. 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 He has had the CoVid vaccines.

## 2021-04-07 NOTE — PROGRESS NOTES
[facility]  Pulmonary, Critical Care, and Sleep Medicine      Pulmonary Office visit    Name: Jeanette Alvarez     : 1945     Date: 2021        Subjective:   Patient has been referred for evaluation of: SOB, follow-up asthma COPD    21     Feels was feeling significantly improved with combination of LAMA and LABA. He obtains meds from [facility]). Lately he has noticed increase symptoms of shortness of breath and is wondering if the effect of the medication is wearing off versus there is progression of underlying process. He also reports not getting enough benefit from the nebulized meds-thinks that his nebulizer is more than 11years old and may not be functioning well. Is requesting a new nebulizer. Complains of wheezing but does not have significant cough  Still not able to get in deep breaths and has not been very compliant with his breathing exercises or using [place]. He does have persistent back pain and worsening spinal stenosis with difficulty ambulating. Has had frequent falls  States that his cardiac condition has been fairly well stabilized  Prostrate Ca under control. 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 He was initially found to have cardiomyopathy due to viral illness in . He had an EF of 15% initially. He had normal coronaries at that time. In years past he's had EF's as high as 50% but his most recent echo earlier this year showed an ejection fraction down to 35%. He's had progressive decrease in blood pressures and has had his meds reduced. He's no longer taking hydralazine and his Coreg dose has been decreasd from 50 to 25 mg bid. His main complaints are continued fatigue and shortness of breath. He had asthma as a child and hence the diagnosis of COPD  He denies cigarette smoking.    He had been on Spiriva and Advair but had to stop Spiriva about 1 year back due to cost of medication. Never hospitalized for COPD/asthma exacerbations but has had several ER visits for bronchitis, Pneumonia. Admits to reflux. Denies significant cough, dry or productive. Denies nasal congestion, postnasal drainage. C/o severe leg weakness- bilateral.  C/o occasional edema LE. He has had no chest pain. He does have a history of hypertension. He has had a recent increase in hemoglobin A1C but no history of overt diabetes. He has elevated cholesterol on medications. There is family history of cardiac disease. He denies stroke, rheumatic fever, or claudication. He is currently undergoing radiation therapy for prostrate ca. Had prostrate surgery followed by orchiectomy in [id number]. No chemotherapy. PAST SURGICAL HISTORY: Pertinent for a prostatectomy in 2015, orchiectomy in 2016, hiatal hernia repair in 2013 which failed, and total knee replacement on the right. MEDICAL HISTORY: Pertinent for the cardiomyopathy, hypertension, COPD with asthma, Mason's esophagus, pectus excavatum with pectoral implants. SOB:   Symptoms occur on exertion. Able to walk about 200  Feet. Cannot climb stairs.  Activities of daily living are affected  Denies orthopnea/ paroxysmal nocturnal dyspnea  SOB relieved with rest, inhalers/ nebulizers    Imaging studies- CXR  Other testing- PFT, 6 min walk  Comorbid conditions include- HTN, Cardiomyopathy, Neuropathy, Mason's esophagus, Hiatal hernia, Pectus deformity  Never Smoker  Occupational exposure-none    Past Medical History:   Diagnosis Date    Abdominal wall hernia     Anxiety     Asthma     Mason's esophagus with esophagitis     Cardiomyopathy     Chronic fatigue     Chronic lung disease     CKD (chronic kidney disease), stage III ([address]     COPD (chronic obstructive pulmonary disease) ([address]     Coronary artery disease     Decreased hearing     Deficiency anemia     Depression     DJD (degenerative joint disease)     Esophageal reflux     Hiatal hernia     Hiatus hernia syndrome     History of blood transfusion     HLD (hyperlipidemia)     HTN (hypertension)     Major depressive disorder     Mixed incontinence     Personal history of prostate cancer     Prostate cancer ([address]     [place]    Prostate cancer metastatic to bone [facility])     Renal disease     Renal failure     Urinary incontinence     Urinary tract infection, site not specified     UTI (urinary tract infection)     Vitamin D deficiency     Wears dentures        Past Surgical History:   Procedure Laterality Date    BIOPSY PROSTATE      [id number] gl4+3.  HX HERNIA REPAIR      HX KNEE REPLACEMENT      HX ORCHIECTOMY  08/11/2016    HX OTHER SURGICAL      IMPLANTS    HX OTHER SURGICAL      TOTAL KNEE/ TOTAL HIP REPLACEMENT PRE OP ORDERSET    HX OTHER SURGICAL  08/11/2016    HERNIA REPAIR LAPAROSCOPIC    HX OTHER SURGICAL  08/11/2016    TESTICULAR PROSTHESIS INSERTION    HX RADICAL PROSTATECTOMY  12/09/[place]       Allergies   Allergen Reactions    Egg Unable to Obtain    Ketorolac Tromethamine Other (comments)     Kidney insufficient    Nsaids (Non-Steroidal Anti-Inflammatory Drug) Other (comments)    Prilosec [Omeprazole] Other (comments)     Flank and abd pain    Proton Pump Inhibitors Other (comments)     Current Outpatient Medications   Medication Sig Dispense Refill    albuterol-ipratropium (DUO-NEB) 2.5 mg-0.5 mg/3 ml nebu USE 3 ML VIA NEBULIZER EVERY 6 HOURS AS NEEDED FOR WHEEZING 360 mL 3    fluticasone furoate-vilanteroL (Breo Ellipta) 100-25 mcg/dose inhaler Take 1 Puff by inhalation daily. 2 Inhaler 0    tiotropium (Spiriva with HandiHaler) 18 mcg inhalation capsule Take 1 Cap by inhalation daily. 1 Cap 5    gabapentin (Neurontin) 300 mg capsule Take 300 mg by mouth as needed for Pain.  One to two tabs as needed for leg cramps      predniSONE (DELTASONE) 10 mg tablet Take 5 mg by mouth two (2) times a day.      QUEtiapine (SEROqueL) 100 mg tablet Take 100 mg by mouth nightly.  solifenacin (VESICARE) 5 mg tablet Take 1 Tab by mouth daily. 30 Tab 12    famotidine (PEPCID) 20 mg tablet Take 20 mg by mouth two (2) times a day.  trospium (SANCTURA) 20 mg tablet Take 1 Tab by mouth two (2) times a day. 60 Tab 12    Mucus Clearing Device (AEROBIKA OSCILLATING PEP SYSTM) bharathi 1 Each by Does Not Apply route two (2) times a day. 1 Device 0    carvedilol (COREG) 12.5 mg tablet TK 2 TS PO BID  3    hydrALAZINE (APRESOLINE) 10 mg tablet TK 1 T PO TID  1    aspirin delayed-release 81 mg tablet 81 mg.      furosemide (LASIX) 40 mg tablet 20 mg every other day.  pramipexole (MIRAPEX) 0.25 mg tablet Take 0.25 mg by mouth daily as needed.  pravastatin (PRAVACHOL) 20 mg tablet TAKE 1 TABLET EVERY EVENING      traZODone (DESYREL) 300 mg tablet 400 mg nightly.  QUEtiapine (SEROQUEL) 25 mg tablet TK 3 TS PO QHS  0    PROAIR HFA 90 mcg/actuation inhaler       ondansetron (ZOFRAN ODT) 4 mg disintegrating tablet 8 mg.            Review of Systems:  HEENT: No epistaxis, no nasal drainage, no difficulty in swallowing, no redness in eyes  Respiratory: as above  Cardiovascular: no chest pain, no palpitations, no chronic leg edema, no syncope  Gastrointestinal: no abd pain, no vomiting, no diarrhea, no bleeding symptoms  Genitourinary: No urinary symptoms or hematuria  Integument/breast: No ulcers or rashes  Musculoskeletal: Progressive lower extremity weakness, frequent falls  Neurological: No focal weakness, no seizures, no headaches  Behvioral/Psych: No anxiety, no depression  Constitutional: No fever, no chills, + weight loss, no night sweats     Objective:     Visit Vitals  /60 (BP 1 Location: Left upper arm, BP Patient Position: Sitting, BP Cuff Size: Adult)   Pulse 68   Temp 98.1 °F (36.7 °C)   Resp 20   Ht 5' 6\" (1.676 m)   Wt 86.2 kg (190 lb)   SpO2 98%   BMI 30.67 kg/m²        Physical Exam: General: comfortable, no acute distress  HEENT: pupils reactive, sclera anicteric, EOM intact  Neck: No adenopathy or thyroid swelling, no lymphadenopathy or JVD, supple  CVS: S1S2 no murmurs  Chest-kyphotic curvature, decreased excursion  RS: Mod AE bilaterally, no tactile fremitus or egophony, no accessory muscle use  Abd: soft, non tender, no hepatosplenomegaly  Neuro: non focal, awake, alert  Extrm: no leg edema, clubbing or cyanosis  Skin: no rash    Data review:   Pertinent labs: CBC, BMP, LFT's  PSA- 9.7    PFT:    Date FVC% FEV1  FEV1/FVC ASP05-45 TLC RV RV/TLC VC DLCO   7/14/2017 48 32 67 17                                              PFT:9/28/16  FVC 2.12 (51 %)  FEV1 1.29 (43 %)->+24% post BD  FEV1/FVC 61 %  TLC 80 %   %  DL/VA 63 %    SIX MINUTE WALK: 8/17/16    Interpretation: Ambulatory oximetry Does not show significant desaturation;starting O2 sat 98 %; low O2 sat 97 %;   starting HR 70 bpm; high HR 79 bpm  Dyspnea scale: beginning 3; ending 5  Total distance : 770 ft   Total time: 6 minutes      Echo: 7/10/2017:  Normal LV function normal, RV dilated. NORMAL LEFT VENTRICULAR CAVITY SIZE WITH MODERATE CONCENTRIC LEFT VENTRICULAR   HYPERTROPHY. REDUCED LEFT VENTRICULAR EJECTION FRACTION BUT EXACT EF  CANNOT BE DETERMINED WITH THIS   STUDY   MILD (STAGE I) DIASTOLIC DYSFUNCTION. MODERATELY DILATED LEFT ATRIUM. DILATED RIGHT VENTRICULAR SIZE WITH NORMAL SYSTOLIC FUNCTION. NO HEMODYNAMICALLY SIGNIFICANT VALVULAR PATHOLOGY. ESTIMATED PULMONARY ARTERY PRESSURE IS 32 MMHG. AORTIC ROOT DILATATION. NO OBVIOUS MASSES, SHUNTS OR THROMBI SEEN. Imaging:  I have personally reviewed the patients radiographs and have reviewed the reports:  V/Q scan 8/3/2017: Moderate central deposition of radiotracer on ventilatory phase images, suggestive of nonlaminar/turbulent flow, most often seen with COPD.   There is no evidence of moderate or large mismatched segmental perfusion defect to indicate the presence of pulmonary embolism. IMPRESSION:     Low probability for pulmonary embolism. CXR:2/1/17  Cardiac enlargement with no acute infiltrate. There is a moderate size hiatal hernia. Calcified hilar and mediastinal lymph nodes indicating old granulomatous disease. No acute infiltrate or edema is identified    CXR: 10/18/16 CXR independently reviewed and results discussed with pt. Improved aeration with decreasing airspace disease right lower lobe. Mild parenchymal scarring. Stable cardiomegaly. Hiatal hernia. CXR: 10/13/16:  New airspace consolidation in the lateral right midlung on the PA view suspicious for pneumonia. Stable cardiomegaly. Stable linear parenchymal scarring in the left midlung. Persistent hiatal hernia.          Patient Active Problem List   Diagnosis Code    Essential hypertension I10    COPD (chronic obstructive pulmonary disease) ([address] J44.9    Mason's esophagus K22.70    Multiple-type hyperlipidemia E78.2    Major depressive disorder, recurrent severe without psychotic features ([address] F33.2    Cardiomyopathy ([address] I42.9    Prostate cancer, [id number]  dvp [place] [id number] gl4+3(tertiary 5) psa gray 2.2 with bone mets C61    Bone Metastases from Prostate Cancer C61, C79.51    Mixed incontinence N39.46    Urinary tract infection N39.0    Chronic fatigue R53.82    Wears dentures Z97.2    Urinary tract infection, site not specified N39.0    Urinary incontinence R32    Renal failure N19    Renal disease N28.9    Personal history of prostate cancer Z85.46    History of blood transfusion Z92.89    Hiatus hernia syndrome K44.9    Hiatal hernia K44.9    Esophageal reflux K21.9    DJD (degenerative joint disease) M19.90    Depression F32.9    Deficiency anemia D53.9    Decreased hearing H91.90    Coronary artery disease I25.10    Asthma J45.909    Anxiety F41.9    Abdominal wall hernia K43.9    Chronic low back pain M54.5, G89.29    History of surgical procedure Z98.890    Incisional hernia K43.2    Atelectasis J98.11    Pneumonia due to infectious organism J18.9    Bronchitis, chronic, simple ([facility]) J41.0    Vitamin D deficiency E55.9    Major depressive disorder F32.9    HTN (hypertension) I10    HLD (hyperlipidemia) E78.5    Mason's esophagus with esophagitis K22.70, K20.90    CHF (congestive heart failure) ([facility]) I50.9    Chronic kidney disease, stage III (moderate) ([facility]) N18.30    Hx of bacterial pneumonia Z87.01    Hx of fracture of rib Z87.81    Low testosterone R79.89    Osteopenia M85.80    Prediabetes R73.03    Restless legs syndrome G25.81     IMPRESSION:   · SOB: multifactorial. Predominant ASTHMA-COPD Significant Pulmonary functional impairment from  Obstructive airway component: advance asthma- COPD airway remodeling over the years with additional restriction from neuromuscular/skeletal contributors: pectus deformity with reconstruction and hormonal metaboliceffects ? Additional role from hiatal hernia and GERD. VTE ruled out with V/Q scan-low probability. Additional cardiovascular factors- cardiomyopathy, systolic and daistolic failure . Now there appears to be additional and predominant factor from restrictive impairment-back pain as well as kyphotic curvature and neuromuscular weakness.   · Chronic obstructive bronchitis -asthma COPD overlap  · Cardiomyopathy  · Hiatal hernia  · Anxiety-depression  · Prostrate ca  · Spinal stenosis with lower extremity weakness      RECOMMENDATIONS:   · Will continue to optimize treatment for obstructive airways defect component-Breztri-samples provided to see if he will benefit from change of inhalers  · Encouraged to use airway clearance device in the form of Aerobika  · Written Prescriptions will be given at patient request.-Proair  · New nebulizer prescription placed  · Early intervention for exacerbations, control triggers- GERD/hiatal hernia factors  · Will benefit from Pulmonary rehab-difficulty participating  · Will check PFT's and 6 min walk as clinically indicated-ordered today could not complete 6 minutes  · Will check overnight pulse oximetry to see if he needs supplemental oxygen  · Aspiration precautions and GERD treatment  · Discussed in details plan of care and answered questions to the best of my ability. · Will follow up in 4 months     Health maintenance screens deferred to Primary care provider.      Vikas Barker MD

## 2021-04-08 ENCOUNTER — CLINICAL SUPPORT (OUTPATIENT)
Dept: PULMONOLOGY | Age: 76
End: 2021-04-08

## 2021-04-08 ENCOUNTER — TELEPHONE (OUTPATIENT)
Dept: PULMONOLOGY | Age: 76
End: 2021-04-08

## 2021-04-08 DIAGNOSIS — R06.02 SOB (SHORTNESS OF BREATH) ON EXERTION: ICD-10-CM

## 2021-04-08 DIAGNOSIS — J44.9 CHRONIC OBSTRUCTIVE PULMONARY DISEASE, UNSPECIFIED COPD TYPE (HCC): ICD-10-CM

## 2021-04-08 DIAGNOSIS — J44.9 ASTHMA-COPD OVERLAP SYNDROME (HCC): ICD-10-CM

## 2021-04-08 DIAGNOSIS — J45.30 MILD PERSISTENT ASTHMA WITHOUT COMPLICATION: Primary | ICD-10-CM

## 2021-04-08 NOTE — TELEPHONE ENCOUNTER
The pt. Is left a message re: does he have a specific DME company that he wishes to obtain the new nebulizer from? We have the order ready to fax. Another message is left. He also needs to return the Overnight Ox. Chelsea Soto

## 2021-04-09 ENCOUNTER — PATIENT MESSAGE (OUTPATIENT)
Dept: PULMONOLOGY | Age: 76
End: 2021-04-09

## 2021-04-15 ENCOUNTER — TELEPHONE (OUTPATIENT)
Dept: PULMONOLOGY | Age: 76
End: 2021-04-15

## 2021-04-15 NOTE — TELEPHONE ENCOUNTER
Danelle from [facility] called because pt is requesting a rollator and they need an order.    FTTFC-[phone]  Fax- [phone]

## 2021-04-21 DIAGNOSIS — J44.9 ASTHMA-COPD OVERLAP SYNDROME (HCC): ICD-10-CM

## 2021-04-21 DIAGNOSIS — J45.30 MILD PERSISTENT ASTHMA WITHOUT COMPLICATION: ICD-10-CM

## 2021-04-21 DIAGNOSIS — R06.02 SOB (SHORTNESS OF BREATH) ON EXERTION: ICD-10-CM

## 2021-04-21 DIAGNOSIS — J44.9 CHRONIC OBSTRUCTIVE PULMONARY DISEASE, UNSPECIFIED COPD TYPE (HCC): ICD-10-CM

## 2021-04-21 LAB
O2 AMOUNT, POCO2: NORMAL
POC PULSE OXIMETRY, POCSPO2: NORMAL

## 2021-04-21 PROCEDURE — 94762 N-INVAS EAR/PLS OXIMTRY CONT: CPT | Performed by: INTERNAL MEDICINE

## 2021-05-28 NOTE — TELEPHONE ENCOUNTER
Andre Hu, LPN 7/1/8585 4:64 AM EDT      ----- Message -----  From: Mark Coronado  Sent: 4/9/2021 1:55 AM EDT  To: [facility]  Subject: Non-Urgent Medical Question     Dear Morgan Thompson,   I handed in the pulse ox Thursday at 2 PM at the . Please hold onto the consultation to the medical supply store until I do some research. Thank you for all your efforts . Have a nice weekend.   Mark Coronado

## 2021-08-03 PROBLEM — I10 ESSENTIAL HYPERTENSION: Status: RESOLVED | Noted: 2021-08-03 | Resolved: 2021-08-03

## 2021-08-03 PROBLEM — F32.A DEPRESSION: Status: RESOLVED | Noted: 2021-08-03 | Resolved: 2021-08-03

## 2021-09-01 ENCOUNTER — TELEPHONE (OUTPATIENT)
Dept: PULMONOLOGY | Age: 76
End: 2021-09-01

## 2021-09-01 NOTE — TELEPHONE ENCOUNTER
Pt called([phone]). He wants to talk to nurse regarding his medication regimen. He states that [facility] sent him and should he take those medications. Please call him back.

## 2021-09-01 NOTE — TELEPHONE ENCOUNTER
The meds he has been on since April, Yupeli, Budesonide and Perforomist he feels are not working as well has the inhalers did. He is having a and exacerbation of his COPD now. Appt given for the am with Dr. Deisy De Jesus.  Patient to bring in the 3 meds listed above

## 2021-09-01 NOTE — TELEPHONE ENCOUNTER
Pt called stating that he was having a flare up of his copd. No openings until November at this time. Pt does not wish to wait this long.  Please advise [phone]

## 2021-09-02 ENCOUNTER — OFFICE VISIT (OUTPATIENT)
Dept: PULMONOLOGY | Age: 76
End: 2021-09-02
Payer: MEDICARE

## 2021-09-02 VITALS
TEMPERATURE: 97 F | HEIGHT: 68 IN | BODY MASS INDEX: 28.01 KG/M2 | SYSTOLIC BLOOD PRESSURE: 112 MMHG | RESPIRATION RATE: 18 BRPM | WEIGHT: 184.8 LBS | DIASTOLIC BLOOD PRESSURE: 60 MMHG | HEART RATE: 52 BPM | OXYGEN SATURATION: 98 %

## 2021-09-02 DIAGNOSIS — C61 PROSTATE CANCER (HCC): ICD-10-CM

## 2021-09-02 DIAGNOSIS — J98.11 ATELECTASIS: ICD-10-CM

## 2021-09-02 DIAGNOSIS — J44.9 ASTHMA-COPD OVERLAP SYNDROME (HCC): Primary | ICD-10-CM

## 2021-09-02 DIAGNOSIS — J41.0 BRONCHITIS, CHRONIC, SIMPLE (HCC): ICD-10-CM

## 2021-09-02 PROCEDURE — G8752 SYS BP LESS 140: HCPCS | Performed by: INTERNAL MEDICINE

## 2021-09-02 PROCEDURE — 1101F PT FALLS ASSESS-DOCD LE1/YR: CPT | Performed by: INTERNAL MEDICINE

## 2021-09-02 PROCEDURE — 99214 OFFICE O/P EST MOD 30 MIN: CPT | Performed by: INTERNAL MEDICINE

## 2021-09-02 PROCEDURE — G8417 CALC BMI ABV UP PARAM F/U: HCPCS | Performed by: INTERNAL MEDICINE

## 2021-09-02 PROCEDURE — G8536 NO DOC ELDER MAL SCRN: HCPCS | Performed by: INTERNAL MEDICINE

## 2021-09-02 PROCEDURE — G8427 DOCREV CUR MEDS BY ELIG CLIN: HCPCS | Performed by: INTERNAL MEDICINE

## 2021-09-02 PROCEDURE — G9717 DOC PT DX DEP/BP F/U NT REQ: HCPCS | Performed by: INTERNAL MEDICINE

## 2021-09-02 PROCEDURE — G8754 DIAS BP LESS 90: HCPCS | Performed by: INTERNAL MEDICINE

## 2021-09-02 RX ORDER — FORMOTEROL FUMARATE 20 UG/2ML
SOLUTION RESPIRATORY (INHALATION)
COMMUNITY

## 2021-09-02 RX ORDER — FLUTICASONE FUROATE, UMECLIDINIUM BROMIDE AND VILANTEROL TRIFENATATE 200; 62.5; 25 UG/1; UG/1; UG/1
14 POWDER RESPIRATORY (INHALATION) DAILY
Qty: 2 EACH | Refills: 0 | Status: SHIPPED | COMMUNITY
Start: 2021-09-02 | End: 2021-09-24 | Stop reason: SDUPTHER

## 2021-09-02 RX ORDER — BUDESONIDE 0.5 MG/2ML
1000 INHALANT ORAL 2 TIMES DAILY
Qty: 60 EACH | Refills: 3 | Status: SHIPPED | OUTPATIENT
Start: 2021-09-02

## 2021-09-02 RX ORDER — REVEFENACIN 175 UG/3ML
175 SOLUTION RESPIRATORY (INHALATION) DAILY
COMMUNITY

## 2021-09-02 RX ORDER — ALBUTEROL SULFATE 90 UG/1
2 AEROSOL, METERED RESPIRATORY (INHALATION)
Qty: 1 EACH | Refills: 4 | Status: SHIPPED | OUTPATIENT
Start: 2021-09-02

## 2021-09-02 RX ORDER — BUDESONIDE 0.5 MG/2ML
500 INHALANT ORAL
COMMUNITY
End: 2021-09-02 | Stop reason: SDUPTHER

## 2021-09-02 NOTE — PROGRESS NOTES
[facility]  Pulmonary, Critical Care, and Sleep Medicine      Pulmonary Office visit    Name: Mitchell Thornton     : 1945     Date: 2021        Subjective:   Patient has been referred for evaluation of: SOB, follow-up asthma COPD    21     Patient's medications were changed in April due to cost .  Placed on a regimen of nebulized budesonide and Perforomist twice daily along with Shonda Aver once a day  He states that since his been on this regimen he has needed to use more rescue inhaler and also feels that he does not get all the medication and effectively  Prior to this he was doing well on combination therapy with-with LABA LAMA and ICS in the form of Trelegy and Breztri    Complains of wheezing but does not have significant cough  Still not able to get in deep breaths and has not been very compliant with his breathing exercises or using [place]. He does have persistent back pain and worsening spinal stenosis with difficulty ambulating. In addition he is seeing neurology for possible diagnosis of Parkinson's-cerebellar issues  Has had frequent falls  States that his cardiac condition has been fairly well stabilized  Prostrate Ca under control. 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 He was initially found to have cardiomyopathy due to viral illness in . He had an EF of 15% initially. He had normal coronaries at that time. In years past he's had EF's as high as 50% but his most recent echo earlier this year showed an ejection fraction down to 35%. He's had progressive decrease in blood pressures and has had his meds reduced. He's no longer taking hydralazine and his Coreg dose has been decreasd from 50 to 25 mg bid. His main complaints are continued fatigue and shortness of breath.    He had asthma as a child and hence the diagnosis of COPD  He denies cigarette smoking. He had been on Spiriva and Advair but had to stop Spiriva about 1 year back due to cost of medication. Never hospitalized for COPD/asthma exacerbations but has had several ER visits for bronchitis, Pneumonia. He is currently undergoing radiation therapy for prostrate ca. Had prostrate surgery followed by orchiectomy in [id number]. No chemotherapy. PAST SURGICAL HISTORY: Pertinent for a prostatectomy in 2015, orchiectomy in 2016, hiatal hernia repair in 2013 which failed, and total knee replacement on the right. MEDICAL HISTORY: Pertinent for the cardiomyopathy, hypertension, COPD with asthma, Mason's esophagus, pectus excavatum with pectoral implants. SOB:   Symptoms occur on exertion. Able to walk about 200  Feet. Cannot climb stairs.  Activities of daily living are affected  Denies orthopnea/ paroxysmal nocturnal dyspnea  SOB relieved with rest, inhalers/ nebulizers    Imaging studies- CXR  Other testing- PFT, 6 min walk  Comorbid conditions include- HTN, Cardiomyopathy, Neuropathy, Mason's esophagus, Hiatal hernia, Pectus deformity  Never Smoker  Occupational exposure-none    Past Medical History:   Diagnosis Date    Abdominal wall hernia     Anxiety     Asthma     Mason's esophagus with esophagitis     Cardiomyopathy     Chronic fatigue     Chronic lung disease     CKD (chronic kidney disease), stage III (HCC)     [facility]) [facility]     Coronary artery disease     Decreased hearing     Deficiency anemia     Depression     DJD (degenerative joint disease)     Esophageal reflux     Hiatal hernia     Hiatus hernia syndrome     History of blood transfusion     HLD (hyperlipidemia)     HTN (hypertension)     Major depressive disorder     Mixed incontinence     Personal history of prostate cancer     Prostate cancer ([facility]     Christopher 7    Prostate cancer metastatic to bone [facility])     Renal disease     Renal failure     Urinary incontinence     Urinary tract infection, site not specified     UTI (urinary tract infection)     Vitamin D deficiency     Wears dentures        Past Surgical History:   Procedure Laterality Date    BIOPSY PROSTATE      [id number] gl4+3.  HX HERNIA REPAIR      HX KNEE REPLACEMENT      HX ORCHIECTOMY  08/11/2016    HX OTHER SURGICAL      IMPLANTS    HX OTHER SURGICAL      TOTAL KNEE/ TOTAL HIP REPLACEMENT PRE OP ORDERSET    HX OTHER SURGICAL  08/11/2016    HERNIA REPAIR LAPAROSCOPIC    HX OTHER SURGICAL  08/11/2016    TESTICULAR PROSTHESIS INSERTION    HX RADICAL PROSTATECTOMY  12/09/2015 [place]       Allergies   Allergen Reactions    Egg Unable to Obtain    Ketorolac Tromethamine Other (comments)     Kidney insufficient    Nsaids (Non-Steroidal Anti-Inflammatory Drug) Other (comments)    Prilosec [Omeprazole] Other (comments)     Flank and abd pain    Proton Pump Inhibitors Other (comments)     Current Outpatient Medications   Medication Sig Dispense Refill    budesonide (PULMICORT) 0.5 mg/2 mL nbsp 500 mcg by Nebulization route. Every 12 Hrs      revefenacin (Yupelri) 175 mcg/3 mL nebu nebulizer solution 175 mcg by Nebulization route daily.  formoterol (PERFOROMIST) 20 mcg/2 mL nebu neb solution by Nebulization route. 2 times Daily      OTHER Seroflo 500 One puff two times daily ([facility]) [facility]      albuterol-ipratropium (DUO-NEB) 2.5 mg-0.5 mg/3 ml nebu USE 3 ML VIA NEBULIZER EVERY 6 HOURS AS NEEDED FOR WHEEZING 360 mL 3    gabapentin (Neurontin) 300 mg capsule Take 300 mg by mouth as needed for Pain. One to two tabs as needed for leg cramps      QUEtiapine (SEROqueL) 100 mg tablet Take 100 mg by mouth nightly.  famotidine (PEPCID) 20 mg tablet Take 20 mg by mouth two (2) times a day.  Mucus Clearing Device (AEROBIKA OSCILLATING PEP SYSTM) bharathi 1 Each by Does Not Apply route two (2) times a day.  1 Device 0    carvedilol (COREG) 12.5 mg tablet TK 2 TS PO BID  3    hydrALAZINE (APRESOLINE) 10 mg tablet TK 1 T PO TID  1    aspirin delayed-release 81 mg tablet 81 mg.      furosemide (LASIX) 40 mg tablet 20 mg every other day.  pramipexole (MIRAPEX) 0.25 mg tablet Take 0.25 mg by mouth daily as needed.  pravastatin (PRAVACHOL) 20 mg tablet TAKE 1 TABLET EVERY EVENING      traZODone (DESYREL) 300 mg tablet 400 mg nightly.  tiotropium (SPIRIVA) 18 mcg inhalation capsule Take 1 Cap by inhalation daily. (Patient not taking: Reported on 9/2/2021)      budesonide-glycopyr-formoterol (Breztri Aerosphere) 160-9-4.8 mcg/actuation HFAA Take 2 Puffs by inhalation two (2) times a day. (Patient not taking: Reported on 9/2/2021) 3 Inhaler 0    ProAir HFA 90 mcg/actuation inhaler Take 2 Puffs by inhalation every six (6) hours as needed for Wheezing. (Patient not taking: Reported on 9/2/2021) 1 Inhaler 4    fluticasone furoate-vilanteroL (Breo Ellipta) 100-25 mcg/dose inhaler Take 1 Puff by inhalation daily. (Patient not taking: Reported on 9/2/2021) 2 Inhaler 0    tiotropium (Spiriva with HandiHaler) 18 mcg inhalation capsule Take 1 Cap by inhalation daily. (Patient not taking: Reported on 9/2/2021) 1 Cap 5    predniSONE (DELTASONE) 10 mg tablet Take 5 mg by mouth two (2) times a day. (Patient not taking: Reported on 9/2/2021)      solifenacin (VESICARE) 5 mg tablet Take 1 Tab by mouth daily. (Patient not taking: Reported on 9/2/2021) 30 Tab 12    trospium (SANCTURA) 20 mg tablet Take 1 Tab by mouth two (2) times a day. (Patient not taking: Reported on 9/2/2021) 60 Tab 12    QUEtiapine (SEROQUEL) 25 mg tablet TK 3 TS PO QHS  0    ondansetron (ZOFRAN ODT) 4 mg disintegrating tablet 8 mg.  (Patient not taking: Reported on 9/2/2021)           Review of Systems:  HEENT: No epistaxis, no nasal drainage, no difficulty in swallowing, no redness in eyes  Respiratory: as above  Cardiovascular: no chest pain, no palpitations, no chronic leg edema, no syncope  Gastrointestinal: no abd pain, no vomiting, no diarrhea, no bleeding symptoms  Genitourinary: No urinary symptoms or hematuria  Integument/breast: No ulcers or rashes  Musculoskeletal: Progressive lower extremity weakness, frequent falls  Neurological: No focal weakness, no seizures, no headaches  Behvioral/Psych: No anxiety, no depression  Constitutional: No fever, no chills, + weight loss, no night sweats     Objective:     Visit Vitals  BP (!) 144/72 (BP 1 Location: Left upper arm, BP Patient Position: Sitting, BP Cuff Size: Large adult)   Pulse (!) 52   Temp 97 °F (36.1 °C) (Oral)   Resp 18   Ht 5' 8\" (1.727 m)   Wt 83.8 kg (184 lb 12.8 oz)   SpO2 98% Comment: RA Rest   BMI 28.10 kg/m²        Physical Exam:   General: comfortable, no acute distress  HEENT: pupils reactive, sclera anicteric, EOM intact  Neck: No adenopathy or thyroid swelling, no lymphadenopathy or JVD, supple  CVS: S1S2 no murmurs  Chest-kyphotic curvature, decreased excursion  RS: Mod AE bilaterally, no tactile fremitus or egophony, no accessory muscle use  Abd: soft, non tender, no hepatosplenomegaly  Neuro: non focal, awake, alert  Extrm: no leg edema, clubbing or cyanosis  Skin: no rash    Data review:   Pertinent labs: CBC, BMP, LFT's  PSA- 9.7    PFT:    Date FVC% FEV1  FEV1/FVC [id number] TLC RV RV/TLC VC DLCO   7/14/2017 48 32 67 17                                              PFT:9/28/16  FVC 2.12 (51 %)  FEV1 1.29 (43 %)->+24% post BD  FEV1/FVC 61 %  TLC 80 %   %  DL/VA 63 %    SIX MINUTE WALK: 8/17/16    Interpretation: Ambulatory oximetry Does not show significant desaturation;starting O2 sat 98 %; low O2 sat 97 %;   starting HR 70 bpm; high HR 79 bpm  Dyspnea scale: beginning 3; ending 5  Total distance : 770 ft   Total time: 6 minutes      Echo: 7/10/2017:  Normal LV function normal, RV dilated. NORMAL LEFT VENTRICULAR CAVITY SIZE WITH MODERATE CONCENTRIC LEFT VENTRICULAR   HYPERTROPHY. REDUCED LEFT VENTRICULAR EJECTION FRACTION BUT EXACT EF  CANNOT BE DETERMINED WITH THIS   STUDY   MILD (STAGE I) DIASTOLIC DYSFUNCTION. MODERATELY DILATED LEFT ATRIUM. DILATED RIGHT VENTRICULAR SIZE WITH NORMAL SYSTOLIC FUNCTION. NO HEMODYNAMICALLY SIGNIFICANT VALVULAR PATHOLOGY. ESTIMATED PULMONARY ARTERY PRESSURE IS 32 MMHG. AORTIC ROOT DILATATION. NO OBVIOUS MASSES, SHUNTS OR THROMBI SEEN. Imaging:  I have personally reviewed the patients radiographs and have reviewed the reports:  V/Q scan 8/3/2017: Moderate central deposition of radiotracer on ventilatory phase images, suggestive of nonlaminar/turbulent flow, most often seen with COPD. There is no evidence of moderate or large mismatched segmental perfusion defect to indicate the presence of pulmonary embolism. IMPRESSION:     Low probability for pulmonary embolism. CXR:2/1/17  Cardiac enlargement with no acute infiltrate. There is a moderate size hiatal hernia. Calcified hilar and mediastinal lymph nodes indicating old granulomatous disease. No acute infiltrate or edema is identified    CXR: 10/18/16 CXR independently reviewed and results discussed with pt. Improved aeration with decreasing airspace disease right lower lobe. Mild parenchymal scarring. Stable cardiomegaly. Hiatal hernia. CXR: 10/13/16:  New airspace consolidation in the lateral right midlung on the PA view suspicious for pneumonia. Stable cardiomegaly. Stable linear parenchymal scarring in the left midlung. Persistent hiatal hernia.          Patient Active Problem List   Diagnosis Code    Asthma-COPD overlap syndrome ([address] J44.9    Mason's esophagus K22.70    Multiple-type hyperlipidemia E78.2    Major depressive disorder, recurrent severe without psychotic features ([address] F33.2    Cardiomyopathy ([address] I42.9    Prostate cancer, [id number]  dvp [place] [id number] gl4+3(tertiary 5) psa gray 2.2 with bone mets C61    Bone Metastases from [place], C79.51  Mixed incontinence N39.46    Urinary tract infection N39.0    Chronic fatigue R53.82    Wears dentures Z97.2    Urinary tract infection, site not specified N39.0    Urinary incontinence R32    Renal failure N19    Renal disease N28.9    Personal history of prostate cancer Z85.46    History of blood transfusion Z92.89    Hiatus hernia syndrome K44.9    Hiatal hernia K44.9    Esophageal reflux K21.9    DJD (degenerative joint disease) M19.90    Deficiency anemia D53.9    Decreased hearing H91.90    Coronary artery disease I25.10    Asthma J45.909    Anxiety F41.9    Abdominal wall hernia K43.9    Chronic low back pain M54.5, G89.29    History of surgical procedure Z98.890    Incisional hernia K43.2    Atelectasis J98.11    Pneumonia due to infectious organism J18.9    Bronchitis, chronic, simple ([facility] J41.0    Vitamin D deficiency E55.9    Major depressive disorder F32.9    HTN (hypertension) I10    HLD (hyperlipidemia) E78.5    Mason's esophagus with esophagitis K22.70, K20.90    CHF (congestive heart failure) ([facility]) I50.9    Chronic kidney disease, stage III (moderate) ([facility]) N18.30    Hx of bacterial pneumonia Z87.01    Hx of fracture of rib Z87.81    Low testosterone R79.89    Osteopenia M85.80    Prediabetes R73.03    Restless legs syndrome G25.81     IMPRESSION:   · SOB: multifactorial. Predominant ASTHMA-COPD Significant Pulmonary functional impairment from  Obstructive airway component: advance asthma- COPD airway remodeling over the years with additional restriction from neuromuscular/skeletal contributors: pectus deformity with reconstruction and hormonal metaboliceffects ? Additional role from hiatal hernia and GERD. VTE ruled out with V/Q scan-low probability. Additional cardiovascular factors- cardiomyopathy, systolic and daistolic failure .   Now there appears to be additional and predominant factor from restrictive impairment-back pain as well as kyphotic curvature and neuromuscular weakness. · Chronic obstructive bronchitis -asthma COPD overlap  · Cardiomyopathy  · Hiatal hernia  · Anxiety-depression  · Prostrate ca  · Spinal stenosis with lower extremity weakness      RECOMMENDATIONS:   · Will continue to optimize treatment for obstructive airways defect component-Trelegy 200-samples provided to see if he will benefit. Will reorder prescription if patient finds benefit otherwise continue with nebulized triple therapy and increased dose of budesonide 2000 mcg  · Encouraged to use airway clearance device in the form of Aerobika  · Proair refilled  · Early intervention for exacerbations, control triggers- GERD/hiatal hernia factors  · Will benefit from Pulmonary rehab-difficulty participating due to neuromuscular weakness  · Will check PFT's and 6 min walk as clinically indicated-ordered today could not complete 6 minutes  · Will check overnight pulse oximetry to see if he needs supplemental oxygen  · Aspiration precautions and GERD treatment  · Discussed in details plan of care and answered questions to the best of my ability. · Will follow up in 4 months     Health maintenance screens deferred to Primary care provider.      Evelin Ayala MD

## 2021-09-02 NOTE — LETTER
9/2/2021    Patient: Valeria Neely   YOB: 1945   Date of Visit: 9/2/2021     Nilo Moraes NP  [address] [place] [id number]  Via Fax: [phone]    Dear Nilo Moraes NP,      Thank you for referring Mr. Valeria Neely to [address] [place] [address] for evaluation. My notes for this consultation are attached. If you have questions, please do not hesitate to call me. I look forward to following your patient along with you.       Sincerely,    Shanelle Hernandez MD

## 2021-09-02 NOTE — PROGRESS NOTES
Memory Awe presents today for   Chief Complaint   Patient presents with    Shortness of Breath       Is someone accompanying this pt? No    Is the patient using any DME equipment during OV? No    -DME Company NA    Depression Screening:  No flowsheet data found. Learning Assessment:  Learning Assessment 7/14/2017   PRIMARY LEARNER Patient   PRIMARY LANGUAGE ENGLISH   LEARNER PREFERENCE PRIMARY DEMONSTRATION     LISTENING     PICTURES     READING     VIDEOS   ANSWERED BY Patient   RELATIONSHIP SELF       Abuse Screening:  No flowsheet data found. Fall Risk  Fall Risk Assessment, last 12 mths 4/7/2021   Able to walk? Yes   Fall in past 12 months? 1   Do you feel unsteady? 1   Are you worried about falling 1   Number of falls in past 12 months 2   Fall with injury? 1         Coordination of Care:  1. Have you been to the ER, urgent care clinic since your last visit? Hospitalized since your last visit? No    2. Have you seen or consulted any other health care providers outside of the [address] [place] [address] [facility] since your last visit? Include any pap smears or colon screening.  No

## 2021-09-24 ENCOUNTER — TELEPHONE (OUTPATIENT)
Dept: PULMONOLOGY | Age: 76
End: 2021-09-24

## 2021-09-24 NOTE — TELEPHONE ENCOUNTER
Pt would like to speak with someone re medications. He would like to make some changes. Would not give any other information.  Please advise [phone]

## 2021-09-24 NOTE — TELEPHONE ENCOUNTER
Patient requesting refill of duo-neb solution and trelegy ellipta on 9/24/2021. Patient also requesting sample of trelegy ellipta. Last office visit: 9/02/2021  Follow up Visit: Due January 2021    Provider is aware of last office visit and follow up. No further action requested from provider.

## 2021-10-05 RX ORDER — IPRATROPIUM BROMIDE AND ALBUTEROL SULFATE 2.5; .5 MG/3ML; MG/3ML
SOLUTION RESPIRATORY (INHALATION)
Qty: 360 ML | Refills: 3 | Status: SHIPPED | OUTPATIENT
Start: 2021-10-05

## 2021-10-05 RX ORDER — FLUTICASONE FUROATE, UMECLIDINIUM BROMIDE AND VILANTEROL TRIFENATATE 200; 62.5; 25 UG/1; UG/1; UG/1
1 POWDER RESPIRATORY (INHALATION) DAILY
Qty: 28 EACH | Refills: 3 | Status: SHIPPED | OUTPATIENT
Start: 2021-10-05 | End: 2021-11-02

## 2021-10-05 RX ORDER — FLUTICASONE FUROATE, UMECLIDINIUM BROMIDE AND VILANTEROL TRIFENATATE 200; 62.5; 25 UG/1; UG/1; UG/1
14 POWDER RESPIRATORY (INHALATION) DAILY
Qty: 2 EACH | Refills: 0 | Status: SHIPPED | OUTPATIENT
Start: 2021-10-05 | End: 2021-10-06

## 2021-10-06 RX ORDER — FLUTICASONE FUROATE, UMECLIDINIUM BROMIDE AND VILANTEROL TRIFENATATE 200; 62.5; 25 UG/1; UG/1; UG/1
POWDER RESPIRATORY (INHALATION)
Qty: 120 EACH | Refills: 3 | Status: SHIPPED | OUTPATIENT
Start: 2021-10-06 | End: 2021-11-18 | Stop reason: SDUPTHER

## 2021-11-16 ENCOUNTER — TELEPHONE (OUTPATIENT)
Dept: PULMONOLOGY | Age: 76
End: 2021-11-16

## 2021-11-16 DIAGNOSIS — R06.02 SOB (SHORTNESS OF BREATH) ON EXERTION: Primary | ICD-10-CM

## 2021-11-16 NOTE — TELEPHONE ENCOUNTER
This may be more cardiac  Have him come in to get pulse ox checked at rest and walk  I am also ordering a chest x-ray and an echocardiogram which he needs to get done.   Will review reports and make further recommendations

## 2021-11-16 NOTE — TELEPHONE ENCOUNTER
Pt states he is having very frequent breathing attacks. He is very sob and wheezing, and it's within in hours when each attack has occurred. He is using more of his medications as well. Please call [phone].

## 2021-11-16 NOTE — TELEPHONE ENCOUNTER
Patient c/o sob with activity. For several weeks he has notice a big difference in sob with activity. Patient does not have a pulse ox so has not tested.   No openings until Jan .

## 2021-11-18 ENCOUNTER — TELEPHONE (OUTPATIENT)
Dept: PULMONOLOGY | Age: 76
End: 2021-11-18

## 2021-11-18 RX ORDER — FLUTICASONE FUROATE, UMECLIDINIUM BROMIDE AND VILANTEROL TRIFENATATE 200; 62.5; 25 UG/1; UG/1; UG/1
1 POWDER RESPIRATORY (INHALATION) DAILY
Qty: 3 EACH | Refills: 0 | Status: SHIPPED | COMMUNITY
Start: 2021-11-18 | End: 2021-12-16 | Stop reason: SDUPTHER

## 2021-11-18 NOTE — TELEPHONE ENCOUNTER
Patient requesting Echo and xray to be done at [facility]. I will fax order to them and also mail a copy to his home.    He is also requesting Trelegy samples due to donut hole

## 2021-11-18 NOTE — TELEPHONE ENCOUNTER
Trelegy sample provided  Will await completion of echo and x-ray prior to making further recommendations

## 2021-12-15 NOTE — TELEPHONE ENCOUNTER
Left message (we have patientt on Trelegy and also he had requested samples which are ready for pickup)

## 2021-12-16 RX ORDER — FLUTICASONE FUROATE, UMECLIDINIUM BROMIDE AND VILANTEROL TRIFENATATE 200; 62.5; 25 UG/1; UG/1; UG/1
1 POWDER RESPIRATORY (INHALATION) DAILY
Qty: 3 EACH | Refills: 0 | Status: SHIPPED | OUTPATIENT
Start: 2021-12-16

## 2022-02-15 ENCOUNTER — DOCUMENTATION ONLY (OUTPATIENT)
Dept: PULMONOLOGY | Age: 77
End: 2022-02-15

## 2022-03-24 ENCOUNTER — DOCUMENTATION ONLY (OUTPATIENT)
Dept: PULMONOLOGY | Age: 77
End: 2022-03-24

## 2022-03-24 NOTE — PROGRESS NOTES
left vm for pt to talk to Anny-need to cassy 4/19 apt with Dr. Malaika Sanz d/t dr kumar here in afternoon

## 2022-03-29 ENCOUNTER — TELEPHONE (OUTPATIENT)
Dept: PULMONOLOGY | Age: 77
End: 2022-03-29

## 2022-03-29 NOTE — TELEPHONE ENCOUNTER
Spoke with patient. He has complaints of occasional, mild wheezing & worsening SOB. Patient states SOB is the worst when his nebulizer treatments wear off. He states he is currently taking albuterol neb solution 3 x day, yupelri 1 x day, budesonide 3-4 x day (supposed to be 2 x day), perforomist 2  X day. Patient is out of Breo and is requesting samples.  Patient states his Echo has been scheduled at [place] for [facility] May, and he is requesting a virtual appointment with Dr. Warren Trivedi for after 4 PM.

## 2022-03-29 NOTE — TELEPHONE ENCOUNTER
Pt is requesting samples of Breo. Please call [phone]. Also, called pt to reschedule 4/19 to am apt. Pt wants to know if Dr. Osmani Wallace can do a virtual apt after 4pm on a day soon.

## 2022-03-30 RX ORDER — FLUTICASONE FUROATE AND VILANTEROL TRIFENATATE 200; 25 UG/1; UG/1
1 POWDER RESPIRATORY (INHALATION) DAILY
Qty: 28 EACH | Refills: 0 | Status: SHIPPED | COMMUNITY
Start: 2022-03-30

## 2022-11-07 NOTE — TELEPHONE ENCOUNTER
Spoke with patient. He is getting himself a pulse ox to monitor his pulse ox. He wishes to have the xray and echo done at [facility] in [place].  The orders where faxed to that facility TRANSFER - IN REPORT:    Verbal report received from Inga Huffman RN on [place]  being received from GI Lab for routine progression of patient care      Report consisted of patient's Situation, Background, Assessment and   Recommendations(SBAR). Information from the following report(s) Nurse Handoff Report was reviewed with the receiving nurse. Opportunity for questions and clarification was provided.

## 2023-02-15 NOTE — TELEPHONE ENCOUNTER
Received notice from [address] [place] [address] stating that the Advair was authorized 2/27/18 - 12/31/18. The notification is faxed to the pt's pharmacy [facility]. There are no Wet Read(s) to document. There are no Wet Read(s) to document.

## 2023-04-25 NOTE — TELEPHONE ENCOUNTER
Problem: RESPIRATORY - ADULT  Goal: Achieves optimal ventilation and oxygenation  Description: INTERVENTIONS:  - Assess for changes in respiratory status  - Assess for changes in mentation and behavior  - Position to facilitate oxygenation and minimize respiratory effort  - Oxygen administered by appropriate delivery if ordered  - Initiate smoking cessation education as indicated  - Encourage broncho-pulmonary hygiene including cough, deep breathe, Incentive Spirometry  - Assess the need for suctioning and aspirate as needed  - Assess and instruct to report SOB or any respiratory difficulty  - Respiratory Therapy support as indicated  Outcome: Progressing     Problem: PAIN - ADULT  Goal: Verbalizes/displays adequate comfort level or baseline comfort level  Description: Interventions:  - Encourage patient to monitor pain and request assistance  - Assess pain using appropriate pain scale  - Administer analgesics based on type and severity of pain and evaluate response  - Implement non-pharmacological measures as appropriate and evaluate response  - Consider cultural and social influences on pain and pain management  - Notify physician/advanced practitioner if interventions unsuccessful or patient reports new pain  Outcome: Progressing     Problem: SAFETY ADULT  Goal: Maintain or return to baseline ADL function  Description: INTERVENTIONS:  -  Assess patient's ability to carry out ADLs; assess patient's baseline for ADL function and identify physical deficits which impact ability to perform ADLs (bathing, care of mouth/teeth, toileting, grooming, dressing, etc )  - Assess/evaluate cause of self-care deficits   - Assess range of motion  - Assess patient's mobility; develop plan if impaired  - Assess patient's need for assistive devices and provide as appropriate  - Encourage maximum independence but intervene and supervise when necessary  - Involve family in performance of ADLs  - Assess for home care needs following [facility] [address] [facility] stated they never received pt refill for DuoNeb. Wanting to be resent. Please advise [phone]. Quirino Ferraro RN  Outcome: Progressing

## (undated) DEVICE — Device

## (undated) DEVICE — TEMPLATE BRACHYTHERAPY 17GA GRID DISP FOR ACCUCARE POS AND

## (undated) DEVICE — GAUZE,SPONGE,4"X4",12PLY,STERILE,LF,2'S: Brand: MEDLINE

## (undated) DEVICE — Z DISCONTINUED NO SUB IDED KIT US GEL STANDOFF

## (undated) DEVICE — KENDALL SCD EXPRESS SLEEVES, KNEE LENGTH, MEDIUM: Brand: KENDALL SCD

## (undated) DEVICE — SOLUTION IRRIG 1000ML H2O STRL BLT

## (undated) DEVICE — TOWEL SURG W16XL26IN BLU NONFENESTRATED DLX ST 2 PER PK

## (undated) DEVICE — STERILE POLYISOPRENE POWDER-FREE SURGICAL GLOVES: Brand: PROTEXIS

## (undated) DEVICE — SYR 10ML CTRL LR LCK NSAF LF --

## (undated) DEVICE — NDL PRT INJ NSAF BLNT 18GX1.5 --

## (undated) DEVICE — NEEDLE HYPO 25GA L1.5IN BVL ORIENTED ECLIPSE

## (undated) DEVICE — TABLE COVER: Brand: CONVERTORS

## (undated) DEVICE — TRAY F 16F URIN M